# Patient Record
Sex: MALE | Race: ASIAN | NOT HISPANIC OR LATINO
[De-identification: names, ages, dates, MRNs, and addresses within clinical notes are randomized per-mention and may not be internally consistent; named-entity substitution may affect disease eponyms.]

---

## 2017-01-12 ENCOUNTER — RECORDS - HEALTHEAST (OUTPATIENT)
Dept: ADMINISTRATIVE | Facility: OTHER | Age: 77
End: 2017-01-12

## 2017-01-23 ENCOUNTER — OFFICE VISIT - HEALTHEAST (OUTPATIENT)
Dept: FAMILY MEDICINE | Facility: CLINIC | Age: 77
End: 2017-01-23

## 2017-01-23 DIAGNOSIS — M10.9 ACUTE GOUT: ICD-10-CM

## 2017-01-23 DIAGNOSIS — M10.9 GOUT: ICD-10-CM

## 2017-01-23 ASSESSMENT — MIFFLIN-ST. JEOR: SCORE: 1288.66

## 2017-01-26 ENCOUNTER — COMMUNICATION - HEALTHEAST (OUTPATIENT)
Dept: FAMILY MEDICINE | Facility: CLINIC | Age: 77
End: 2017-01-26

## 2017-01-26 DIAGNOSIS — M10.9 ACUTE GOUT: ICD-10-CM

## 2017-02-01 ENCOUNTER — COMMUNICATION - HEALTHEAST (OUTPATIENT)
Dept: FAMILY MEDICINE | Facility: CLINIC | Age: 77
End: 2017-02-01

## 2017-02-01 DIAGNOSIS — E55.9 UNSPECIFIED VITAMIN D DEFICIENCY: ICD-10-CM

## 2017-03-23 ENCOUNTER — RECORDS - HEALTHEAST (OUTPATIENT)
Dept: ADMINISTRATIVE | Facility: OTHER | Age: 77
End: 2017-03-23

## 2017-04-12 ENCOUNTER — COMMUNICATION - HEALTHEAST (OUTPATIENT)
Dept: FAMILY MEDICINE | Facility: CLINIC | Age: 77
End: 2017-04-12

## 2017-04-17 ENCOUNTER — OFFICE VISIT - HEALTHEAST (OUTPATIENT)
Dept: FAMILY MEDICINE | Facility: CLINIC | Age: 77
End: 2017-04-17

## 2017-04-17 DIAGNOSIS — R50.9 FEVER: ICD-10-CM

## 2017-04-17 ASSESSMENT — MIFFLIN-ST. JEOR: SCORE: 1296.6

## 2017-04-18 ENCOUNTER — RECORDS - HEALTHEAST (OUTPATIENT)
Dept: ADMINISTRATIVE | Facility: OTHER | Age: 77
End: 2017-04-18

## 2017-04-20 ENCOUNTER — COMMUNICATION - HEALTHEAST (OUTPATIENT)
Dept: FAMILY MEDICINE | Facility: CLINIC | Age: 77
End: 2017-04-20

## 2017-05-05 ENCOUNTER — COMMUNICATION - HEALTHEAST (OUTPATIENT)
Dept: SCHEDULING | Facility: CLINIC | Age: 77
End: 2017-05-05

## 2017-05-05 DIAGNOSIS — M10.9 GOUT: ICD-10-CM

## 2017-05-31 ENCOUNTER — OFFICE VISIT - HEALTHEAST (OUTPATIENT)
Dept: FAMILY MEDICINE | Facility: CLINIC | Age: 77
End: 2017-05-31

## 2017-05-31 DIAGNOSIS — R04.2 COUGHING UP BLOOD: ICD-10-CM

## 2017-06-27 ENCOUNTER — COMMUNICATION - HEALTHEAST (OUTPATIENT)
Dept: FAMILY MEDICINE | Facility: CLINIC | Age: 77
End: 2017-06-27

## 2017-06-29 ENCOUNTER — OFFICE VISIT - HEALTHEAST (OUTPATIENT)
Dept: FAMILY MEDICINE | Facility: CLINIC | Age: 77
End: 2017-06-29

## 2017-06-29 DIAGNOSIS — R04.2 HEMOPTYSIS: ICD-10-CM

## 2017-06-29 DIAGNOSIS — I10 HTN (HYPERTENSION): ICD-10-CM

## 2017-06-29 DIAGNOSIS — Z79.899 POLYPHARMACY: ICD-10-CM

## 2017-06-29 DIAGNOSIS — M10.9 GOUT, UNSPECIFIED: ICD-10-CM

## 2017-06-29 DIAGNOSIS — E55.9 VITAMIN D DEFICIENCY: ICD-10-CM

## 2017-06-29 ASSESSMENT — MIFFLIN-ST. JEOR: SCORE: 1297.96

## 2017-07-25 ENCOUNTER — COMMUNICATION - HEALTHEAST (OUTPATIENT)
Dept: FAMILY MEDICINE | Facility: CLINIC | Age: 77
End: 2017-07-25

## 2017-07-25 DIAGNOSIS — I10 ESSENTIAL HYPERTENSION: ICD-10-CM

## 2017-07-25 DIAGNOSIS — M10.9 ACUTE GOUT: ICD-10-CM

## 2017-07-25 DIAGNOSIS — M25.562 ARTHRALGIA OF LEFT LOWER LEG: ICD-10-CM

## 2017-08-14 ENCOUNTER — RECORDS - HEALTHEAST (OUTPATIENT)
Dept: ADMINISTRATIVE | Facility: OTHER | Age: 77
End: 2017-08-14

## 2017-08-31 ENCOUNTER — COMMUNICATION - HEALTHEAST (OUTPATIENT)
Dept: FAMILY MEDICINE | Facility: CLINIC | Age: 77
End: 2017-08-31

## 2017-08-31 ENCOUNTER — OFFICE VISIT - HEALTHEAST (OUTPATIENT)
Dept: FAMILY MEDICINE | Facility: CLINIC | Age: 77
End: 2017-08-31

## 2017-08-31 DIAGNOSIS — E55.9 VITAMIN D DEFICIENCY: ICD-10-CM

## 2017-08-31 DIAGNOSIS — Z23 NEED FOR IMMUNIZATION AGAINST INFLUENZA: ICD-10-CM

## 2017-08-31 DIAGNOSIS — I10 ESSENTIAL HYPERTENSION: ICD-10-CM

## 2017-08-31 DIAGNOSIS — M10.072 ACUTE IDIOPATHIC GOUT OF LEFT ANKLE: ICD-10-CM

## 2017-08-31 ASSESSMENT — MIFFLIN-ST. JEOR: SCORE: 1314.74

## 2017-09-04 ENCOUNTER — COMMUNICATION - HEALTHEAST (OUTPATIENT)
Dept: FAMILY MEDICINE | Facility: CLINIC | Age: 77
End: 2017-09-04

## 2017-09-04 DIAGNOSIS — E55.9 UNSPECIFIED VITAMIN D DEFICIENCY: ICD-10-CM

## 2017-10-02 ENCOUNTER — OFFICE VISIT - HEALTHEAST (OUTPATIENT)
Dept: FAMILY MEDICINE | Facility: CLINIC | Age: 77
End: 2017-10-02

## 2017-10-02 ENCOUNTER — RECORDS - HEALTHEAST (OUTPATIENT)
Dept: GENERAL RADIOLOGY | Facility: CLINIC | Age: 77
End: 2017-10-02

## 2017-10-02 ENCOUNTER — COMMUNICATION - HEALTHEAST (OUTPATIENT)
Dept: FAMILY MEDICINE | Facility: CLINIC | Age: 77
End: 2017-10-02

## 2017-10-02 DIAGNOSIS — M79.672 FOOT PAIN, LEFT: ICD-10-CM

## 2017-10-02 DIAGNOSIS — J30.2 SEASONAL ALLERGIES: ICD-10-CM

## 2017-10-02 DIAGNOSIS — M10.9 GOUT FLARE: ICD-10-CM

## 2017-10-02 DIAGNOSIS — M10.9 GOUT: ICD-10-CM

## 2017-10-02 DIAGNOSIS — I10 HTN (HYPERTENSION): ICD-10-CM

## 2017-10-02 DIAGNOSIS — M79.672 PAIN IN LEFT FOOT: ICD-10-CM

## 2017-10-02 ASSESSMENT — MIFFLIN-ST. JEOR: SCORE: 1305.33

## 2017-11-15 ENCOUNTER — COMMUNICATION - HEALTHEAST (OUTPATIENT)
Dept: FAMILY MEDICINE | Facility: CLINIC | Age: 77
End: 2017-11-15

## 2017-11-15 DIAGNOSIS — E55.9 VITAMIN D DEFICIENCY: ICD-10-CM

## 2017-11-27 ENCOUNTER — COMMUNICATION - HEALTHEAST (OUTPATIENT)
Dept: SCHEDULING | Facility: CLINIC | Age: 77
End: 2017-11-27

## 2017-11-27 DIAGNOSIS — M10.9 GOUT: ICD-10-CM

## 2018-01-01 ENCOUNTER — COMMUNICATION - HEALTHEAST (OUTPATIENT)
Dept: FAMILY MEDICINE | Facility: CLINIC | Age: 78
End: 2018-01-01

## 2018-01-01 DIAGNOSIS — E55.9 VITAMIN D DEFICIENCY: ICD-10-CM

## 2018-01-01 RX ORDER — MULTIVITAMIN WITH FOLIC ACID 400 MCG
TABLET ORAL
Qty: 100 TABLET | Refills: 3 | Status: SHIPPED | OUTPATIENT
Start: 2018-01-01

## 2018-01-04 ENCOUNTER — OFFICE VISIT - HEALTHEAST (OUTPATIENT)
Dept: FAMILY MEDICINE | Facility: CLINIC | Age: 78
End: 2018-01-04

## 2018-01-04 DIAGNOSIS — Z79.899 POLYPHARMACY: ICD-10-CM

## 2018-01-04 DIAGNOSIS — Z00.00 PREVENTATIVE HEALTH CARE: ICD-10-CM

## 2018-01-04 DIAGNOSIS — E55.9 VITAMIN D DEFICIENCY: ICD-10-CM

## 2018-01-04 DIAGNOSIS — I10 HTN (HYPERTENSION): ICD-10-CM

## 2018-01-04 DIAGNOSIS — Z51.81 THERAPEUTIC DRUG MONITORING: ICD-10-CM

## 2018-01-04 DIAGNOSIS — M10.9 GOUT: ICD-10-CM

## 2018-01-04 LAB
ALBUMIN SERPL-MCNC: 3.6 G/DL (ref 3.5–5)
ALP SERPL-CCNC: 60 U/L (ref 45–120)
ALT SERPL W P-5'-P-CCNC: 31 U/L (ref 0–45)
ANION GAP SERPL CALCULATED.3IONS-SCNC: 10 MMOL/L (ref 5–18)
AST SERPL W P-5'-P-CCNC: 36 U/L (ref 0–40)
BILIRUB SERPL-MCNC: 0.5 MG/DL (ref 0–1)
BUN SERPL-MCNC: 23 MG/DL (ref 8–28)
CALCIUM SERPL-MCNC: 9.8 MG/DL (ref 8.5–10.5)
CHLORIDE BLD-SCNC: 106 MMOL/L (ref 98–107)
CHOLEST SERPL-MCNC: 165 MG/DL
CO2 SERPL-SCNC: 23 MMOL/L (ref 22–31)
CREAT SERPL-MCNC: 1.02 MG/DL (ref 0.7–1.3)
FASTING STATUS PATIENT QL REPORTED: NO
GFR SERPL CREATININE-BSD FRML MDRD: >60 ML/MIN/1.73M2
GLUCOSE BLD-MCNC: 85 MG/DL (ref 70–125)
HBA1C MFR BLD: 5.5 % (ref 3.5–6)
HDLC SERPL-MCNC: 38 MG/DL
LDLC SERPL CALC-MCNC: 71 MG/DL
LDLC SERPL CALC-MCNC: ABNORMAL MG/DL
POTASSIUM BLD-SCNC: 4.6 MMOL/L (ref 3.5–5)
PROT SERPL-MCNC: 8.1 G/DL (ref 6–8)
SODIUM SERPL-SCNC: 139 MMOL/L (ref 136–145)
TRIGL SERPL-MCNC: 461 MG/DL
URATE SERPL-MCNC: 5.7 MG/DL (ref 3–8)

## 2018-01-04 ASSESSMENT — MIFFLIN-ST. JEOR: SCORE: 1303.97

## 2018-01-05 LAB — 25(OH)D3 SERPL-MCNC: 50.4 NG/ML (ref 30–80)

## 2018-02-13 ENCOUNTER — RECORDS - HEALTHEAST (OUTPATIENT)
Dept: ADMINISTRATIVE | Facility: OTHER | Age: 78
End: 2018-02-13

## 2018-02-15 ENCOUNTER — OFFICE VISIT - HEALTHEAST (OUTPATIENT)
Dept: FAMILY MEDICINE | Facility: CLINIC | Age: 78
End: 2018-02-15

## 2018-02-15 DIAGNOSIS — M10.9 ACUTE GOUT OF RIGHT WRIST, UNSPECIFIED CAUSE: ICD-10-CM

## 2018-02-15 ASSESSMENT — MIFFLIN-ST. JEOR: SCORE: 1308.51

## 2018-04-03 ENCOUNTER — OFFICE VISIT - HEALTHEAST (OUTPATIENT)
Dept: FAMILY MEDICINE | Facility: CLINIC | Age: 78
End: 2018-04-03

## 2018-04-03 ENCOUNTER — RECORDS - HEALTHEAST (OUTPATIENT)
Dept: GENERAL RADIOLOGY | Facility: CLINIC | Age: 78
End: 2018-04-03

## 2018-04-03 DIAGNOSIS — M25.531 RIGHT WRIST PAIN: ICD-10-CM

## 2018-04-03 DIAGNOSIS — E55.9 VITAMIN D DEFICIENCY: ICD-10-CM

## 2018-04-03 DIAGNOSIS — Z79.899 POLYPHARMACY: ICD-10-CM

## 2018-04-03 DIAGNOSIS — M10.9 GOUT: ICD-10-CM

## 2018-04-03 DIAGNOSIS — M25.531 PAIN IN RIGHT WRIST: ICD-10-CM

## 2018-04-03 DIAGNOSIS — I10 HTN (HYPERTENSION): ICD-10-CM

## 2018-04-03 ASSESSMENT — MIFFLIN-ST. JEOR: SCORE: 1312.02

## 2018-05-03 ENCOUNTER — OFFICE VISIT - HEALTHEAST (OUTPATIENT)
Dept: FAMILY MEDICINE | Facility: CLINIC | Age: 78
End: 2018-05-03

## 2018-05-03 DIAGNOSIS — M10.9 GOUT: ICD-10-CM

## 2018-05-03 DIAGNOSIS — Z79.899 POLYPHARMACY: ICD-10-CM

## 2018-05-03 DIAGNOSIS — E55.9 VITAMIN D DEFICIENCY: ICD-10-CM

## 2018-05-03 DIAGNOSIS — I10 HTN (HYPERTENSION): ICD-10-CM

## 2018-05-03 ASSESSMENT — MIFFLIN-ST. JEOR: SCORE: 1321.83

## 2018-05-22 ENCOUNTER — COMMUNICATION - HEALTHEAST (OUTPATIENT)
Dept: FAMILY MEDICINE | Facility: CLINIC | Age: 78
End: 2018-05-22

## 2018-05-22 DIAGNOSIS — M25.562 ARTHRALGIA OF LEFT LOWER LEG: ICD-10-CM

## 2018-05-31 ENCOUNTER — RECORDS - HEALTHEAST (OUTPATIENT)
Dept: ADMINISTRATIVE | Facility: OTHER | Age: 78
End: 2018-05-31

## 2018-07-17 ENCOUNTER — RECORDS - HEALTHEAST (OUTPATIENT)
Dept: ADMINISTRATIVE | Facility: OTHER | Age: 78
End: 2018-07-17

## 2018-08-09 ENCOUNTER — OFFICE VISIT - HEALTHEAST (OUTPATIENT)
Dept: FAMILY MEDICINE | Facility: CLINIC | Age: 78
End: 2018-08-09

## 2018-08-09 DIAGNOSIS — M10.9 GOUT: ICD-10-CM

## 2018-08-09 DIAGNOSIS — I10 HTN (HYPERTENSION): ICD-10-CM

## 2018-08-09 DIAGNOSIS — E55.9 VITAMIN D DEFICIENCY: ICD-10-CM

## 2018-08-09 DIAGNOSIS — Z79.899 POLYPHARMACY: ICD-10-CM

## 2018-08-09 LAB — URATE SERPL-MCNC: 10.1 MG/DL (ref 3–8)

## 2018-08-09 ASSESSMENT — MIFFLIN-ST. JEOR: SCORE: 1286.05

## 2018-08-10 LAB — 25(OH)D3 SERPL-MCNC: 47.8 NG/ML (ref 30–80)

## 2018-08-14 ENCOUNTER — COMMUNICATION - HEALTHEAST (OUTPATIENT)
Dept: FAMILY MEDICINE | Facility: CLINIC | Age: 78
End: 2018-08-14

## 2018-08-14 DIAGNOSIS — J30.2 SEASONAL ALLERGIES: ICD-10-CM

## 2018-08-28 ENCOUNTER — COMMUNICATION - HEALTHEAST (OUTPATIENT)
Dept: FAMILY MEDICINE | Facility: CLINIC | Age: 78
End: 2018-08-28

## 2018-08-28 DIAGNOSIS — E55.9 VITAMIN D DEFICIENCY: ICD-10-CM

## 2018-08-31 ENCOUNTER — COMMUNICATION - HEALTHEAST (OUTPATIENT)
Dept: FAMILY MEDICINE | Facility: CLINIC | Age: 78
End: 2018-08-31

## 2018-08-31 DIAGNOSIS — E55.9 VITAMIN D DEFICIENCY: ICD-10-CM

## 2019-01-01 ENCOUNTER — AMBULATORY - HEALTHEAST (OUTPATIENT)
Dept: INFUSION THERAPY | Facility: HOSPITAL | Age: 79
End: 2019-01-01

## 2019-01-01 ENCOUNTER — COMMUNICATION - HEALTHEAST (OUTPATIENT)
Dept: ONCOLOGY | Facility: HOSPITAL | Age: 79
End: 2019-01-01

## 2019-01-01 ENCOUNTER — OFFICE VISIT - HEALTHEAST (OUTPATIENT)
Dept: ONCOLOGY | Facility: HOSPITAL | Age: 79
End: 2019-01-01

## 2019-01-01 ENCOUNTER — INFUSION - HEALTHEAST (OUTPATIENT)
Dept: INFUSION THERAPY | Facility: HOSPITAL | Age: 79
End: 2019-01-01

## 2019-01-01 ENCOUNTER — RECORDS - HEALTHEAST (OUTPATIENT)
Dept: ADMINISTRATIVE | Facility: OTHER | Age: 79
End: 2019-01-01

## 2019-01-01 ENCOUNTER — AMBULATORY - HEALTHEAST (OUTPATIENT)
Dept: ONCOLOGY | Facility: HOSPITAL | Age: 79
End: 2019-01-01

## 2019-01-01 ENCOUNTER — HOSPITAL ENCOUNTER (OUTPATIENT)
Dept: PET IMAGING | Facility: HOSPITAL | Age: 79
Setting detail: RADIATION/ONCOLOGY SERIES
Discharge: STILL ADMITTED OR ADMINISTRATIVE | End: 2019-07-26
Attending: INTERNAL MEDICINE

## 2019-01-01 ENCOUNTER — COMMUNICATION - HEALTHEAST (OUTPATIENT)
Dept: FAMILY MEDICINE | Facility: CLINIC | Age: 79
End: 2019-01-01

## 2019-01-01 ENCOUNTER — HOSPITAL ENCOUNTER (OUTPATIENT)
Dept: INTERVENTIONAL RADIOLOGY/VASCULAR | Facility: HOSPITAL | Age: 79
Setting detail: RADIATION/ONCOLOGY SERIES
Discharge: STILL ADMITTED OR ADMINISTRATIVE | End: 2019-07-22
Attending: INTERNAL MEDICINE

## 2019-01-01 ENCOUNTER — COMMUNICATION - HEALTHEAST (OUTPATIENT)
Dept: GERIATRIC MEDICINE | Facility: CLINIC | Age: 79
End: 2019-01-01

## 2019-01-01 ENCOUNTER — HOSPITAL ENCOUNTER (OUTPATIENT)
Dept: PET IMAGING | Facility: HOSPITAL | Age: 79
Setting detail: RADIATION/ONCOLOGY SERIES
Discharge: STILL ADMITTED OR ADMINISTRATIVE | End: 2019-09-25
Attending: INTERNAL MEDICINE

## 2019-01-01 ENCOUNTER — COMMUNICATION - HEALTHEAST (OUTPATIENT)
Dept: SCHEDULING | Facility: CLINIC | Age: 79
End: 2019-01-01

## 2019-01-01 ENCOUNTER — OFFICE VISIT - HEALTHEAST (OUTPATIENT)
Dept: FAMILY MEDICINE | Facility: CLINIC | Age: 79
End: 2019-01-01

## 2019-01-01 ENCOUNTER — HOSPITAL ENCOUNTER (OUTPATIENT)
Dept: ULTRASOUND IMAGING | Facility: HOSPITAL | Age: 79
Discharge: HOME | End: 2019-06-13
Attending: FAMILY MEDICINE

## 2019-01-01 ENCOUNTER — HOSPITAL ENCOUNTER (OUTPATIENT)
Dept: CT IMAGING | Facility: HOSPITAL | Age: 79
Discharge: HOME | End: 2019-06-13
Attending: FAMILY MEDICINE

## 2019-01-01 ENCOUNTER — HOSPITAL ENCOUNTER (OUTPATIENT)
Dept: PET IMAGING | Facility: HOSPITAL | Age: 79
Setting detail: RADIATION/ONCOLOGY SERIES
Discharge: STILL ADMITTED OR ADMINISTRATIVE | End: 2019-07-24
Attending: INTERNAL MEDICINE

## 2019-01-01 ENCOUNTER — COMMUNICATION - HEALTHEAST (OUTPATIENT)
Dept: CARE COORDINATION | Facility: CLINIC | Age: 79
End: 2019-01-01

## 2019-01-01 ENCOUNTER — HOSPITAL ENCOUNTER (OUTPATIENT)
Dept: NUCLEAR MEDICINE | Facility: HOSPITAL | Age: 79
Setting detail: RADIATION/ONCOLOGY SERIES
Discharge: STILL ADMITTED OR ADMINISTRATIVE | End: 2019-07-26
Attending: INTERNAL MEDICINE

## 2019-01-01 ENCOUNTER — HOSPITAL ENCOUNTER (OUTPATIENT)
Dept: ULTRASOUND IMAGING | Facility: HOSPITAL | Age: 79
Discharge: HOME | End: 2019-07-12
Attending: INTERNAL MEDICINE | Admitting: RADIOLOGY

## 2019-01-01 DIAGNOSIS — H91.8X3 OTHER SPECIFIED HEARING LOSS OF BOTH EARS: ICD-10-CM

## 2019-01-01 DIAGNOSIS — M62.81 MUSCLE WEAKNESS (GENERALIZED): ICD-10-CM

## 2019-01-01 DIAGNOSIS — C83.38 DIFFUSE LARGE B-CELL LYMPHOMA OF LYMPH NODES OF MULTIPLE REGIONS (H): ICD-10-CM

## 2019-01-01 DIAGNOSIS — Z60.3 LANGUAGE BARRIER AFFECTING HEALTH CARE: ICD-10-CM

## 2019-01-01 DIAGNOSIS — Z59.6 POVERTY: ICD-10-CM

## 2019-01-01 DIAGNOSIS — I10 ESSENTIAL HYPERTENSION: ICD-10-CM

## 2019-01-01 DIAGNOSIS — J47.9 BRONCHIECTASIS WITHOUT ACUTE EXACERBATION (H): ICD-10-CM

## 2019-01-01 DIAGNOSIS — R19.09 INGUINAL SWELLING: ICD-10-CM

## 2019-01-01 DIAGNOSIS — N50.89 TESTICULAR MASS: ICD-10-CM

## 2019-01-01 DIAGNOSIS — E55.9 VITAMIN D DEFICIENCY: ICD-10-CM

## 2019-01-01 DIAGNOSIS — R26.2 DIFFICULTY WALKING: ICD-10-CM

## 2019-01-01 DIAGNOSIS — D63.8 ANEMIA, CHRONIC DISEASE: ICD-10-CM

## 2019-01-01 DIAGNOSIS — B35.1 DERMATOPHYTOSIS OF NAIL: ICD-10-CM

## 2019-01-01 DIAGNOSIS — Z75.8 LANGUAGE BARRIER AFFECTING HEALTH CARE: ICD-10-CM

## 2019-01-01 DIAGNOSIS — C85.89 DIFFUSE NON-HODGKIN'S LYMPHOMA OF TESTIS (H): ICD-10-CM

## 2019-01-01 DIAGNOSIS — D49.59 TESTICULAR TUMOR: ICD-10-CM

## 2019-01-01 DIAGNOSIS — M25.562 ARTHRALGIA OF BOTH LOWER LEGS: ICD-10-CM

## 2019-01-01 DIAGNOSIS — N50.89 SCROTAL MASS: ICD-10-CM

## 2019-01-01 DIAGNOSIS — M25.561 ARTHRALGIA OF BOTH LOWER LEGS: ICD-10-CM

## 2019-01-01 DIAGNOSIS — Z23 NEED FOR IMMUNIZATION AGAINST INFLUENZA: ICD-10-CM

## 2019-01-01 DIAGNOSIS — M1A.0710 IDIOPATHIC CHRONIC GOUT OF RIGHT ANKLE WITHOUT TOPHUS: ICD-10-CM

## 2019-01-01 DIAGNOSIS — R76.11 NONSPECIFIC REACTION TO TUBERCULIN SKIN TEST WITHOUT ACTIVE TUBERCULOSIS: ICD-10-CM

## 2019-01-01 DIAGNOSIS — Z51.11 ENCOUNTER FOR ANTINEOPLASTIC CHEMOTHERAPY: ICD-10-CM

## 2019-01-01 DIAGNOSIS — Z79.899 POLYPHARMACY: ICD-10-CM

## 2019-01-01 DIAGNOSIS — M25.562 ARTHRALGIA OF LEFT LOWER LEG: ICD-10-CM

## 2019-01-01 DIAGNOSIS — Z51.81 ENCOUNTER FOR THERAPEUTIC DRUG MONITORING: ICD-10-CM

## 2019-01-01 DIAGNOSIS — Z51.81 THERAPEUTIC DRUG MONITORING: ICD-10-CM

## 2019-01-01 DIAGNOSIS — Z11.1 SCREENING EXAMINATION FOR PULMONARY TUBERCULOSIS: ICD-10-CM

## 2019-01-01 DIAGNOSIS — J30.2 SEASONAL ALLERGIES: ICD-10-CM

## 2019-01-01 DIAGNOSIS — R59.0 INGUINAL LYMPHADENOPATHY: ICD-10-CM

## 2019-01-01 DIAGNOSIS — M10.9 GOUT: ICD-10-CM

## 2019-01-01 DIAGNOSIS — M10.072 ACUTE IDIOPATHIC GOUT OF LEFT ANKLE: ICD-10-CM

## 2019-01-01 DIAGNOSIS — E78.2 MIXED HYPERLIPIDEMIA: ICD-10-CM

## 2019-01-01 DIAGNOSIS — I10 HTN (HYPERTENSION): ICD-10-CM

## 2019-01-01 DIAGNOSIS — Z09 HOSPITAL DISCHARGE FOLLOW-UP: ICD-10-CM

## 2019-01-01 DIAGNOSIS — N50.9 DISORDER OF MALE GENITAL ORGANS, UNSPECIFIED: ICD-10-CM

## 2019-01-01 DIAGNOSIS — Z86.2 HISTORY OF ANEMIA: ICD-10-CM

## 2019-01-01 DIAGNOSIS — L85.3 DRY SKIN: ICD-10-CM

## 2019-01-01 DIAGNOSIS — M10.00 IDIOPATHIC GOUT, UNSPECIFIED CHRONICITY, UNSPECIFIED SITE: ICD-10-CM

## 2019-01-01 DIAGNOSIS — Z22.7 LTBI (LATENT TUBERCULOSIS INFECTION): ICD-10-CM

## 2019-01-01 LAB
25(OH)D3 SERPL-MCNC: 38.6 NG/ML (ref 30–80)
ABO/RH(D): NORMAL
AFP SERPL-MCNC: 3.1 UG/ML
ALBUMIN SERPL-MCNC: 2.3 G/DL (ref 3.5–5)
ALBUMIN SERPL-MCNC: 2.8 G/DL (ref 3.5–5)
ALBUMIN SERPL-MCNC: 2.9 G/DL (ref 3.5–5)
ALBUMIN SERPL-MCNC: 3.1 G/DL (ref 3.5–5)
ALBUMIN SERPL-MCNC: 3.6 G/DL (ref 3.5–5)
ALP SERPL-CCNC: 113 U/L (ref 45–120)
ALP SERPL-CCNC: 117 U/L (ref 45–120)
ALP SERPL-CCNC: 52 U/L (ref 45–120)
ALP SERPL-CCNC: 74 U/L (ref 45–120)
ALP SERPL-CCNC: 80 U/L (ref 45–120)
ALT SERPL W P-5'-P-CCNC: 15 U/L (ref 0–45)
ALT SERPL W P-5'-P-CCNC: 22 U/L (ref 0–45)
ALT SERPL W P-5'-P-CCNC: 26 U/L (ref 0–45)
ALT SERPL W P-5'-P-CCNC: 28 U/L (ref 0–45)
ALT SERPL W P-5'-P-CCNC: 40 U/L (ref 0–45)
ANION GAP SERPL CALCULATED.3IONS-SCNC: 10 MMOL/L (ref 5–18)
ANION GAP SERPL CALCULATED.3IONS-SCNC: 11 MMOL/L (ref 5–18)
ANION GAP SERPL CALCULATED.3IONS-SCNC: 6 MMOL/L (ref 5–18)
ANION GAP SERPL CALCULATED.3IONS-SCNC: 6 MMOL/L (ref 5–18)
ANION GAP SERPL CALCULATED.3IONS-SCNC: 7 MMOL/L (ref 5–18)
ANION GAP SERPL CALCULATED.3IONS-SCNC: 8 MMOL/L (ref 5–18)
ANION GAP SERPL CALCULATED.3IONS-SCNC: 9 MMOL/L (ref 5–18)
ANION GAP SERPL CALCULATED.3IONS-SCNC: 9 MMOL/L (ref 5–18)
ANTIBODY SCREEN: NEGATIVE
ARUP MISCELLANEOUS TEST: ABNORMAL
AST SERPL W P-5'-P-CCNC: 12 U/L (ref 0–40)
AST SERPL W P-5'-P-CCNC: 18 U/L (ref 0–40)
AST SERPL W P-5'-P-CCNC: 25 U/L (ref 0–40)
AST SERPL W P-5'-P-CCNC: 28 U/L (ref 0–40)
AST SERPL W P-5'-P-CCNC: 31 U/L (ref 0–40)
BASOPHILS # BLD AUTO: 0 THOU/UL (ref 0–0.2)
BASOPHILS # BLD AUTO: 0.1 THOU/UL (ref 0–0.2)
BASOPHILS # BLD AUTO: 0.1 THOU/UL (ref 0–0.2)
BASOPHILS # BLD AUTO: 0.2 THOU/UL (ref 0–0.2)
BASOPHILS NFR BLD AUTO: 0 % (ref 0–2)
BASOPHILS NFR BLD AUTO: 1 % (ref 0–2)
BASOPHILS NFR BLD AUTO: 2 % (ref 0–2)
BILIRUB SERPL-MCNC: 0.2 MG/DL (ref 0–1)
BILIRUB SERPL-MCNC: 0.2 MG/DL (ref 0–1)
BILIRUB SERPL-MCNC: 0.3 MG/DL (ref 0–1)
BILIRUB SERPL-MCNC: 0.4 MG/DL (ref 0–1)
BILIRUB SERPL-MCNC: 0.5 MG/DL (ref 0–1)
BLD PROD TYP BPU: NORMAL
BLOOD EXPIRATION DATE: NORMAL
BLOOD TYPE: 6200
BUN SERPL-MCNC: 18 MG/DL (ref 8–28)
BUN SERPL-MCNC: 19 MG/DL (ref 8–28)
BUN SERPL-MCNC: 20 MG/DL (ref 8–28)
BUN SERPL-MCNC: 21 MG/DL (ref 8–28)
BUN SERPL-MCNC: 21 MG/DL (ref 8–28)
BUN SERPL-MCNC: 23 MG/DL (ref 8–28)
BUN SERPL-MCNC: 24 MG/DL (ref 8–28)
BUN SERPL-MCNC: 25 MG/DL (ref 8–28)
BUN SERPL-MCNC: 25 MG/DL (ref 8–28)
BUN SERPL-MCNC: 27 MG/DL (ref 8–28)
BUN SERPL-MCNC: 30 MG/DL (ref 8–28)
BUN SERPL-MCNC: 32 MG/DL (ref 8–28)
CALCIUM SERPL-MCNC: 10.4 MG/DL (ref 8.5–10.5)
CALCIUM SERPL-MCNC: 8.4 MG/DL (ref 8.5–10.5)
CALCIUM SERPL-MCNC: 8.5 MG/DL (ref 8.5–10.5)
CALCIUM SERPL-MCNC: 8.7 MG/DL (ref 8.5–10.5)
CALCIUM SERPL-MCNC: 8.8 MG/DL (ref 8.5–10.5)
CALCIUM SERPL-MCNC: 8.8 MG/DL (ref 8.5–10.5)
CALCIUM SERPL-MCNC: 9 MG/DL (ref 8.5–10.5)
CALCIUM SERPL-MCNC: 9.2 MG/DL (ref 8.5–10.5)
CALCIUM SERPL-MCNC: 9.6 MG/DL (ref 8.5–10.5)
CALCIUM SERPL-MCNC: 9.6 MG/DL (ref 8.5–10.5)
CALCIUM SERPL-MCNC: 9.8 MG/DL (ref 8.5–10.5)
CAP COMMENT: ABNORMAL
CHLORIDE BLD-SCNC: 100 MMOL/L (ref 98–107)
CHLORIDE BLD-SCNC: 100 MMOL/L (ref 98–107)
CHLORIDE BLD-SCNC: 102 MMOL/L (ref 98–107)
CHLORIDE BLD-SCNC: 103 MMOL/L (ref 98–107)
CHLORIDE BLD-SCNC: 103 MMOL/L (ref 98–107)
CHLORIDE BLD-SCNC: 104 MMOL/L (ref 98–107)
CHLORIDE BLD-SCNC: 105 MMOL/L (ref 98–107)
CHLORIDE BLD-SCNC: 106 MMOL/L (ref 98–107)
CHLORIDE BLD-SCNC: 106 MMOL/L (ref 98–107)
CHLORIDE BLD-SCNC: 99 MMOL/L (ref 98–107)
CHOLEST SERPL-MCNC: 162 MG/DL
CO2 SERPL-SCNC: 25 MMOL/L (ref 22–31)
CO2 SERPL-SCNC: 26 MMOL/L (ref 22–31)
CO2 SERPL-SCNC: 27 MMOL/L (ref 22–31)
CO2 SERPL-SCNC: 29 MMOL/L (ref 22–31)
CO2 SERPL-SCNC: 30 MMOL/L (ref 22–31)
CO2 SERPL-SCNC: 33 MMOL/L (ref 22–31)
CODING SYSTEM: NORMAL
COMPONENT (HISTORICAL CONVERSION): NORMAL
CREAT BLD-MCNC: 1 MG/DL (ref 0.7–1.3)
CREAT SERPL-MCNC: 0.71 MG/DL (ref 0.7–1.3)
CREAT SERPL-MCNC: 0.72 MG/DL (ref 0.7–1.3)
CREAT SERPL-MCNC: 0.76 MG/DL (ref 0.7–1.3)
CREAT SERPL-MCNC: 0.76 MG/DL (ref 0.7–1.3)
CREAT SERPL-MCNC: 0.77 MG/DL (ref 0.7–1.3)
CREAT SERPL-MCNC: 0.77 MG/DL (ref 0.7–1.3)
CREAT SERPL-MCNC: 0.79 MG/DL (ref 0.7–1.3)
CREAT SERPL-MCNC: 0.81 MG/DL (ref 0.7–1.3)
CREAT SERPL-MCNC: 0.83 MG/DL (ref 0.7–1.3)
CREAT SERPL-MCNC: 0.9 MG/DL (ref 0.7–1.3)
CREAT SERPL-MCNC: 0.92 MG/DL (ref 0.7–1.3)
CREAT SERPL-MCNC: 0.92 MG/DL (ref 0.7–1.3)
CREAT SERPL-MCNC: 0.95 MG/DL (ref 0.7–1.3)
CREAT SERPL-MCNC: 1.04 MG/DL (ref 0.7–1.3)
CREAT SERPL-MCNC: 1.36 MG/DL (ref 0.7–1.3)
CROSSMATCH: NORMAL
DOHLE BODIES: ABNORMAL
EOSINOPHIL # BLD AUTO: 0 THOU/UL (ref 0–0.4)
EOSINOPHIL # BLD AUTO: 0.1 THOU/UL (ref 0–0.4)
EOSINOPHIL # BLD AUTO: 0.2 THOU/UL (ref 0–0.4)
EOSINOPHIL # BLD AUTO: 0.5 THOU/UL (ref 0–0.4)
EOSINOPHIL # BLD AUTO: 0.5 THOU/UL (ref 0–0.4)
EOSINOPHIL COUNT (ABSOLUTE): 0 THOU/UL (ref 0–0.4)
EOSINOPHIL COUNT (ABSOLUTE): 0.1 THOU/UL (ref 0–0.4)
EOSINOPHIL COUNT (ABSOLUTE): 0.3 THOU/UL (ref 0–0.4)
EOSINOPHIL NFR BLD AUTO: 0 % (ref 0–6)
EOSINOPHIL NFR BLD AUTO: 1 % (ref 0–6)
EOSINOPHIL NFR BLD AUTO: 1 % (ref 0–6)
EOSINOPHIL NFR BLD AUTO: 2 % (ref 0–6)
EOSINOPHIL NFR BLD AUTO: 2 % (ref 0–6)
EOSINOPHIL NFR BLD AUTO: 7 % (ref 0–6)
EOSINOPHIL NFR BLD AUTO: 9 % (ref 0–6)
ERYTHROCYTE [DISTWIDTH] IN BLOOD BY AUTOMATED COUNT: 11.4 % (ref 11–14.5)
ERYTHROCYTE [DISTWIDTH] IN BLOOD BY AUTOMATED COUNT: 13.6 % (ref 11–14.5)
ERYTHROCYTE [DISTWIDTH] IN BLOOD BY AUTOMATED COUNT: 13.8 % (ref 11–14.5)
ERYTHROCYTE [DISTWIDTH] IN BLOOD BY AUTOMATED COUNT: 14 % (ref 11–14.5)
ERYTHROCYTE [DISTWIDTH] IN BLOOD BY AUTOMATED COUNT: 14.1 % (ref 11–14.5)
ERYTHROCYTE [DISTWIDTH] IN BLOOD BY AUTOMATED COUNT: 14.2 % (ref 11–14.5)
ERYTHROCYTE [DISTWIDTH] IN BLOOD BY AUTOMATED COUNT: 14.6 % (ref 11–14.5)
ERYTHROCYTE [DISTWIDTH] IN BLOOD BY AUTOMATED COUNT: 14.6 % (ref 11–14.5)
ERYTHROCYTE [DISTWIDTH] IN BLOOD BY AUTOMATED COUNT: 15 % (ref 11–14.5)
ERYTHROCYTE [DISTWIDTH] IN BLOOD BY AUTOMATED COUNT: 15.4 % (ref 11–14.5)
ERYTHROCYTE [DISTWIDTH] IN BLOOD BY AUTOMATED COUNT: 15.7 % (ref 11–14.5)
ERYTHROCYTE [DISTWIDTH] IN BLOOD BY AUTOMATED COUNT: 16 % (ref 11–14.5)
FASTING STATUS PATIENT QL REPORTED: NO
GFR SERPL CREATININE-BSD FRML MDRD: 50 ML/MIN/1.73M2
GFR SERPL CREATININE-BSD FRML MDRD: >60 ML/MIN/1.73M2
GLUCOSE BLD-MCNC: 101 MG/DL (ref 70–125)
GLUCOSE BLD-MCNC: 105 MG/DL (ref 70–125)
GLUCOSE BLD-MCNC: 113 MG/DL (ref 70–125)
GLUCOSE BLD-MCNC: 114 MG/DL (ref 70–125)
GLUCOSE BLD-MCNC: 118 MG/DL (ref 70–125)
GLUCOSE BLD-MCNC: 118 MG/DL (ref 70–125)
GLUCOSE BLD-MCNC: 124 MG/DL (ref 70–125)
GLUCOSE BLD-MCNC: 127 MG/DL (ref 70–125)
GLUCOSE BLD-MCNC: 154 MG/DL (ref 70–125)
GLUCOSE BLD-MCNC: 154 MG/DL (ref 70–125)
GLUCOSE BLD-MCNC: 158 MG/DL (ref 70–125)
GLUCOSE BLD-MCNC: 86 MG/DL (ref 70–125)
GLUCOSE BLD-MCNC: 99 MG/DL (ref 70–125)
GLUCOSE BLDC GLUCOMTR-MCNC: 83 MG/DL (ref 70–139)
GLUCOSE BLDC GLUCOMTR-MCNC: 83 MG/DL (ref 70–139)
HBV CORE AB SERPL QL IA: NEGATIVE
HBV SURFACE AG SERPL QL IA: NEGATIVE
HCG-TM SERPL-ACNC: <3 IU/L
HCT VFR BLD AUTO: 19.3 % (ref 40–54)
HCT VFR BLD AUTO: 22.2 % (ref 40–54)
HCT VFR BLD AUTO: 22.3 % (ref 40–54)
HCT VFR BLD AUTO: 24.5 % (ref 40–54)
HCT VFR BLD AUTO: 24.6 % (ref 40–54)
HCT VFR BLD AUTO: 26 % (ref 40–54)
HCT VFR BLD AUTO: 27.9 % (ref 40–54)
HCT VFR BLD AUTO: 28.4 % (ref 40–54)
HCT VFR BLD AUTO: 30.5 % (ref 40–54)
HCT VFR BLD AUTO: 31.6 % (ref 40–54)
HCT VFR BLD AUTO: 36.2 % (ref 40–54)
HCT VFR BLD AUTO: 45 % (ref 40–54)
HDLC SERPL-MCNC: 37 MG/DL
HGB BLD-MCNC: 10.3 G/DL (ref 14–18)
HGB BLD-MCNC: 10.7 G/DL (ref 14–18)
HGB BLD-MCNC: 11.2 G/DL (ref 14–18)
HGB BLD-MCNC: 12.2 G/DL (ref 14–18)
HGB BLD-MCNC: 15.4 G/DL (ref 14–18)
HGB BLD-MCNC: 6.6 G/DL (ref 14–18)
HGB BLD-MCNC: 7.5 G/DL (ref 14–18)
HGB BLD-MCNC: 7.6 G/DL (ref 14–18)
HGB BLD-MCNC: 8 G/DL (ref 14–18)
HGB BLD-MCNC: 8.4 G/DL (ref 14–18)
HGB BLD-MCNC: 8.9 G/DL (ref 14–18)
HGB BLD-MCNC: 9.3 G/DL (ref 14–18)
HGB BLD-MCNC: 9.7 G/DL (ref 14–18)
IGA SERPL-MCNC: 2075 MG/DL
IGA SERPL-MCNC: 367 MG/DL (ref 65–400)
IGM SERPL-MCNC: 122 MG/DL (ref 60–280)
INR PPP: 1.13 (ref 0.9–1.1)
ISSUE DATE AND TIME: NORMAL
LAB AP CHARGES (HE HISTORICAL CONVERSION): ABNORMAL
LAB AP CHARGES (HE HISTORICAL CONVERSION): ABNORMAL
LAB AP INITIAL CYTO EVAL (HE HISTORICAL CONVERSION): ABNORMAL
LAB MED GENERAL PATH INTERP (HE HISTORICAL CONVERSION): ABNORMAL
LDH SERPL L TO P-CCNC: 202 U/L (ref 125–220)
LDH SERPL L TO P-CCNC: 203 U/L (ref 125–220)
LDH SERPL L TO P-CCNC: 326 U/L (ref 125–220)
LDH SERPL L TO P-CCNC: 368 U/L (ref 125–220)
LDLC SERPL CALC-MCNC: 45 MG/DL
LYMPHOCYTES # BLD AUTO: 0.1 THOU/UL (ref 0.8–4.4)
LYMPHOCYTES # BLD AUTO: 0.2 THOU/UL (ref 0.8–4.4)
LYMPHOCYTES # BLD AUTO: 0.5 THOU/UL (ref 0.8–4.4)
LYMPHOCYTES # BLD AUTO: 0.6 THOU/UL (ref 0.8–4.4)
LYMPHOCYTES # BLD AUTO: 0.6 THOU/UL (ref 0.8–4.4)
LYMPHOCYTES # BLD AUTO: 1 THOU/UL (ref 0.8–4.4)
LYMPHOCYTES # BLD AUTO: 1.2 THOU/UL (ref 0.8–4.4)
LYMPHOCYTES # BLD AUTO: 1.7 THOU/UL (ref 0.8–4.4)
LYMPHOCYTES # BLD AUTO: 2.5 THOU/UL (ref 0.8–4.4)
LYMPHOCYTES NFR BLD AUTO: 11 % (ref 20–40)
LYMPHOCYTES NFR BLD AUTO: 12 % (ref 20–40)
LYMPHOCYTES NFR BLD AUTO: 3 % (ref 20–40)
LYMPHOCYTES NFR BLD AUTO: 3 % (ref 20–40)
LYMPHOCYTES NFR BLD AUTO: 30 % (ref 20–40)
LYMPHOCYTES NFR BLD AUTO: 31 % (ref 20–40)
LYMPHOCYTES NFR BLD AUTO: 39 % (ref 20–40)
LYMPHOCYTES NFR BLD AUTO: 45 % (ref 20–40)
LYMPHOCYTES NFR BLD AUTO: 5 % (ref 20–40)
LYMPHOCYTES NFR BLD AUTO: 5 % (ref 20–40)
LYMPHOCYTES NFR BLD AUTO: 7 % (ref 20–40)
MANUAL NRBC PER 100 CELLS: 1
MCH RBC QN AUTO: 31.6 PG (ref 27–34)
MCH RBC QN AUTO: 31.7 PG (ref 27–34)
MCH RBC QN AUTO: 31.8 PG (ref 27–34)
MCH RBC QN AUTO: 32 PG (ref 27–34)
MCH RBC QN AUTO: 32.4 PG (ref 27–34)
MCH RBC QN AUTO: 32.7 PG (ref 27–34)
MCH RBC QN AUTO: 32.8 PG (ref 27–34)
MCH RBC QN AUTO: 32.8 PG (ref 27–34)
MCH RBC QN AUTO: 33 PG (ref 27–34)
MCH RBC QN AUTO: 33 PG (ref 27–34)
MCH RBC QN AUTO: 33.1 PG (ref 27–34)
MCH RBC QN AUTO: 34.3 PG (ref 27–34)
MCHC RBC AUTO-ENTMCNC: 32.7 G/DL (ref 32–36)
MCHC RBC AUTO-ENTMCNC: 32.7 G/DL (ref 32–36)
MCHC RBC AUTO-ENTMCNC: 33.6 G/DL (ref 32–36)
MCHC RBC AUTO-ENTMCNC: 33.7 G/DL (ref 32–36)
MCHC RBC AUTO-ENTMCNC: 33.8 G/DL (ref 32–36)
MCHC RBC AUTO-ENTMCNC: 33.9 G/DL (ref 32–36)
MCHC RBC AUTO-ENTMCNC: 34.1 G/DL (ref 32–36)
MCHC RBC AUTO-ENTMCNC: 34.2 G/DL (ref 32–36)
MCHC RBC AUTO-ENTMCNC: 34.8 G/DL (ref 32–36)
MCV RBC AUTO: 100 FL (ref 80–100)
MCV RBC AUTO: 93 FL (ref 80–100)
MCV RBC AUTO: 95 FL (ref 80–100)
MCV RBC AUTO: 96 FL (ref 80–100)
MCV RBC AUTO: 96 FL (ref 80–100)
MCV RBC AUTO: 97 FL (ref 80–100)
MCV RBC AUTO: 97 FL (ref 80–100)
MCV RBC AUTO: 98 FL (ref 80–100)
METAMYELOCYTES (ABSOLUTE): 0.1 THOU/UL
METAMYELOCYTES (ABSOLUTE): 1.5 THOU/UL
METAMYELOCYTES NFR BLD MANUAL: 1 %
METAMYELOCYTES NFR BLD MANUAL: 12 %
MISCELLANEOUS TEST DEPT. - HE HISTORICAL: NORMAL
MONOCYTES # BLD AUTO: 0 THOU/UL (ref 0–0.9)
MONOCYTES # BLD AUTO: 0.1 THOU/UL (ref 0–0.9)
MONOCYTES # BLD AUTO: 0.3 THOU/UL (ref 0–0.9)
MONOCYTES # BLD AUTO: 0.5 THOU/UL (ref 0–0.9)
MONOCYTES # BLD AUTO: 0.7 THOU/UL (ref 0–0.9)
MONOCYTES # BLD AUTO: 0.9 THOU/UL (ref 0–0.9)
MONOCYTES # BLD AUTO: 1 THOU/UL (ref 0–0.9)
MONOCYTES # BLD AUTO: 1.1 THOU/UL (ref 0–0.9)
MONOCYTES # BLD AUTO: 1.6 THOU/UL (ref 0–0.9)
MONOCYTES NFR BLD AUTO: 0 % (ref 2–10)
MONOCYTES NFR BLD AUTO: 11 % (ref 2–10)
MONOCYTES NFR BLD AUTO: 12 % (ref 2–10)
MONOCYTES NFR BLD AUTO: 16 % (ref 2–10)
MONOCYTES NFR BLD AUTO: 18 % (ref 2–10)
MONOCYTES NFR BLD AUTO: 30 % (ref 2–10)
MONOCYTES NFR BLD AUTO: 31 % (ref 2–10)
MONOCYTES NFR BLD AUTO: 4 % (ref 2–10)
MONOCYTES NFR BLD AUTO: 7 % (ref 2–10)
MUGA LV EJECTION FRACTION: 59.6 %
MYELOCYTES (ABSOLUTE): 0.3 THOU/UL
MYELOCYTES (ABSOLUTE): 0.9 THOU/UL
MYELOCYTES NFR BLD MANUAL: 3 %
MYELOCYTES NFR BLD MANUAL: 7 %
NEUTROPHILS # BLD AUTO: 0.1 THOU/UL (ref 2–7.7)
NEUTROPHILS # BLD AUTO: 2.4 THOU/UL (ref 2–7.7)
NEUTROPHILS # BLD AUTO: 2.8 THOU/UL (ref 2–7.7)
NEUTROPHILS # BLD AUTO: 3.7 THOU/UL (ref 2–7.7)
NEUTROPHILS # BLD AUTO: 6.8 THOU/UL (ref 2–7.7)
NEUTROPHILS NFR BLD AUTO: 24 % (ref 50–70)
NEUTROPHILS NFR BLD AUTO: 43 % (ref 50–70)
NEUTROPHILS NFR BLD AUTO: 43 % (ref 50–70)
NEUTROPHILS NFR BLD AUTO: 66 % (ref 50–70)
NEUTROPHILS NFR BLD AUTO: 84 % (ref 50–70)
OVALOCYTES: ABNORMAL
PATH REPORT.ADDENDUM SPEC: ABNORMAL
PATH REPORT.COMMENTS IMP SPEC: ABNORMAL
PATH REPORT.FINAL DX SPEC: ABNORMAL
PATH REPORT.FINAL DX SPEC: ABNORMAL
PATH REPORT.MICROSCOPIC SPEC OTHER STN: ABNORMAL
PATH REPORT.RELEVANT HX SPEC: ABNORMAL
PERFORMING LAB: NORMAL
PHOSPHATE SERPL-MCNC: 1.8 MG/DL (ref 2.5–4.5)
PHOSPHATE SERPL-MCNC: 2.5 MG/DL (ref 2.5–4.5)
PHOSPHATE SERPL-MCNC: 2.5 MG/DL (ref 2.5–4.5)
PHOSPHATE SERPL-MCNC: 2.7 MG/DL (ref 2.5–4.5)
PHOSPHATE SERPL-MCNC: 2.7 MG/DL (ref 2.5–4.5)
PHOSPHATE SERPL-MCNC: 2.9 MG/DL (ref 2.5–4.5)
PHOSPHATE SERPL-MCNC: 2.9 MG/DL (ref 2.5–4.5)
PHOSPHATE SERPL-MCNC: 3.6 MG/DL (ref 2.5–4.5)
PHOSPHATE SERPL-MCNC: 4 MG/DL (ref 2.5–4.5)
PHOSPHATE SERPL-MCNC: 4 MG/DL (ref 2.5–4.5)
PLAT MORPH BLD: ABNORMAL
PLAT MORPH BLD: NORMAL
PLATELET # BLD AUTO: 110 THOU/UL (ref 140–440)
PLATELET # BLD AUTO: 176 THOU/UL (ref 140–440)
PLATELET # BLD AUTO: 177 THOU/UL (ref 140–440)
PLATELET # BLD AUTO: 195 THOU/UL (ref 140–440)
PLATELET # BLD AUTO: 199 THOU/UL (ref 140–440)
PLATELET # BLD AUTO: 226 THOU/UL (ref 140–440)
PLATELET # BLD AUTO: 279 THOU/UL (ref 140–440)
PLATELET # BLD AUTO: 283 THOU/UL (ref 140–440)
PLATELET # BLD AUTO: 295 THOU/UL (ref 140–440)
PLATELET # BLD AUTO: 313 THOU/UL (ref 140–440)
PLATELET # BLD AUTO: 33 THOU/UL (ref 140–440)
PLATELET # BLD AUTO: 595 THOU/UL (ref 140–440)
PLATELET # BLD AUTO: 61 THOU/UL (ref 140–440)
PMV BLD AUTO: 10.1 FL (ref 8.5–12.5)
PMV BLD AUTO: 10.7 FL (ref 8.5–12.5)
PMV BLD AUTO: 11 FL (ref 8.5–12.5)
PMV BLD AUTO: 11.2 FL (ref 8.5–12.5)
PMV BLD AUTO: 11.2 FL (ref 8.5–12.5)
PMV BLD AUTO: 12.8 FL (ref 8.5–12.5)
PMV BLD AUTO: 7.4 FL (ref 7–10)
PMV BLD AUTO: 9 FL (ref 8.5–12.5)
PMV BLD AUTO: 9.1 FL (ref 8.5–12.5)
PMV BLD AUTO: 9.4 FL (ref 8.5–12.5)
PMV BLD AUTO: 9.6 FL (ref 8.5–12.5)
PMV BLD AUTO: 9.9 FL (ref 8.5–12.5)
POLYCHROMASIA BLD QL SMEAR: ABNORMAL
POTASSIUM BLD-SCNC: 3 MMOL/L (ref 3.5–5)
POTASSIUM BLD-SCNC: 3 MMOL/L (ref 3.5–5)
POTASSIUM BLD-SCNC: 3.2 MMOL/L (ref 3.5–5)
POTASSIUM BLD-SCNC: 3.3 MMOL/L (ref 3.5–5)
POTASSIUM BLD-SCNC: 3.5 MMOL/L (ref 3.5–5)
POTASSIUM BLD-SCNC: 3.6 MMOL/L (ref 3.5–5)
POTASSIUM BLD-SCNC: 3.7 MMOL/L (ref 3.5–5)
POTASSIUM BLD-SCNC: 4 MMOL/L (ref 3.5–5)
POTASSIUM BLD-SCNC: 4.1 MMOL/L (ref 3.5–5)
POTASSIUM BLD-SCNC: 4.2 MMOL/L (ref 3.5–5)
POTASSIUM BLD-SCNC: 4.6 MMOL/L (ref 3.5–5)
PROT SERPL-MCNC: 5.9 G/DL (ref 6–8)
PROT SERPL-MCNC: 6 G/DL (ref 6–8)
PROT SERPL-MCNC: 7.3 G/DL (ref 6–8)
PROT SERPL-MCNC: 7.6 G/DL (ref 6–8)
PROT SERPL-MCNC: 8.2 G/DL (ref 6–8)
RBC # BLD AUTO: 2.02 MILL/UL (ref 4.4–6.2)
RBC # BLD AUTO: 2.36 MILL/UL (ref 4.4–6.2)
RBC # BLD AUTO: 2.4 MILL/UL (ref 4.4–6.2)
RBC # BLD AUTO: 2.53 MILL/UL (ref 4.4–6.2)
RBC # BLD AUTO: 2.56 MILL/UL (ref 4.4–6.2)
RBC # BLD AUTO: 2.75 MILL/UL (ref 4.4–6.2)
RBC # BLD AUTO: 2.91 MILL/UL (ref 4.4–6.2)
RBC # BLD AUTO: 2.94 MILL/UL (ref 4.4–6.2)
RBC # BLD AUTO: 3.14 MILL/UL (ref 4.4–6.2)
RBC # BLD AUTO: 3.24 MILL/UL (ref 4.4–6.2)
RBC # BLD AUTO: 3.69 MILL/UL (ref 4.4–6.2)
RBC # BLD AUTO: 4.49 MILL/UL (ref 4.4–6.2)
RESULT FLAG (HE HISTORICAL CONVERSION): ABNORMAL
SCHISTOCYTES: ABNORMAL
SODIUM SERPL-SCNC: 135 MMOL/L (ref 136–145)
SODIUM SERPL-SCNC: 135 MMOL/L (ref 136–145)
SODIUM SERPL-SCNC: 136 MMOL/L (ref 136–145)
SODIUM SERPL-SCNC: 137 MMOL/L (ref 136–145)
SODIUM SERPL-SCNC: 137 MMOL/L (ref 136–145)
SODIUM SERPL-SCNC: 138 MMOL/L (ref 136–145)
SODIUM SERPL-SCNC: 139 MMOL/L (ref 136–145)
SODIUM SERPL-SCNC: 139 MMOL/L (ref 136–145)
SODIUM SERPL-SCNC: 141 MMOL/L (ref 136–145)
SODIUM SERPL-SCNC: 143 MMOL/L (ref 136–145)
SPECIMEN DESCRIPTION: ABNORMAL
SPECIMEN STATUS: NORMAL
STATUS (HISTORICAL CONVERSION): NORMAL
TARGETS BLD QL SMEAR: ABNORMAL
TEST NAME: NORMAL
TOTAL NEUTROPHILS-ABS(DIFF): 1.2 THOU/UL (ref 2–7.7)
TOTAL NEUTROPHILS-ABS(DIFF): 19.1 THOU/UL (ref 2–7.7)
TOTAL NEUTROPHILS-ABS(DIFF): 31 THOU/UL (ref 2–7.7)
TOTAL NEUTROPHILS-ABS(DIFF): 6.7 THOU/UL (ref 2–7.7)
TOTAL NEUTROPHILS-ABS(DIFF): 7.1 THOU/UL (ref 2–7.7)
TOTAL NEUTROPHILS-ABS(DIFF): 9 THOU/UL (ref 2–7.7)
TOTAL NEUTROPHILS-REL(DIFF): 39 % (ref 50–70)
TOTAL NEUTROPHILS-REL(DIFF): 72 % (ref 50–70)
TOTAL NEUTROPHILS-REL(DIFF): 72 % (ref 50–70)
TOTAL NEUTROPHILS-REL(DIFF): 92 % (ref 50–70)
TOTAL NEUTROPHILS-REL(DIFF): 96 % (ref 50–70)
TOTAL NEUTROPHILS-REL(DIFF): 97 % (ref 50–70)
TRIGL SERPL-MCNC: 400 MG/DL
UNIT ABO/RH (HISTORICAL CONVERSION): NORMAL
UNIT NUMBER: NORMAL
URATE SERPL-MCNC: 5.4 MG/DL (ref 3–8)
URATE SERPL-MCNC: 5.4 MG/DL (ref 3–8)
URATE SERPL-MCNC: 5.9 MG/DL (ref 3–8)
URATE SERPL-MCNC: 6.7 MG/DL (ref 3–8)
URATE SERPL-MCNC: 6.8 MG/DL (ref 3–8)
URATE SERPL-MCNC: 7 MG/DL (ref 3–8)
URATE SERPL-MCNC: 7.1 MG/DL (ref 3–8)
URATE SERPL-MCNC: 8.3 MG/DL (ref 3–8)
WBC: 0.3 THOU/UL (ref 4–11)
WBC: 0.5 THOU/UL (ref 4–11)
WBC: 10.3 THOU/UL (ref 4–11)
WBC: 12.5 THOU/UL (ref 4–11)
WBC: 19.7 THOU/UL (ref 4–11)
WBC: 3.1 THOU/UL (ref 4–11)
WBC: 32.3 THOU/UL (ref 4–11)
WBC: 4.5 THOU/UL (ref 4–11)
WBC: 5.6 THOU/UL (ref 4–11)
WBC: 6.5 THOU/UL (ref 4–11)
WBC: 7.7 THOU/UL (ref 4–11)
WBC: 9.3 THOU/UL (ref 4–11)

## 2019-01-01 RX ORDER — POLYVINYL ALCOHOL 14 MG/ML
2 SOLUTION/ DROPS OPHTHALMIC PRN
Status: SHIPPED | COMMUNITY
Start: 2019-01-01

## 2019-01-01 RX ORDER — LORATADINE 10 MG/1
10 TABLET ORAL DAILY PRN
Refills: 9 | Status: SHIPPED | COMMUNITY
Start: 2019-01-01

## 2019-01-01 RX ORDER — ACETAMINOPHEN 500 MG
500-1000 TABLET ORAL EVERY 6 HOURS PRN
Qty: 120 TABLET | Refills: 0 | Status: SHIPPED | OUTPATIENT
Start: 2019-01-01

## 2019-01-01 RX ORDER — LIDOCAINE 50 MG/G
1 OINTMENT TOPICAL PRN
Status: SHIPPED | COMMUNITY
Start: 2019-01-01

## 2019-01-01 RX ORDER — LOSARTAN POTASSIUM AND HYDROCHLOROTHIAZIDE 12.5; 5 MG/1; MG/1
TABLET ORAL
Qty: 30 TABLET | Refills: 10 | Status: SHIPPED | OUTPATIENT
Start: 2019-01-01

## 2019-01-01 RX ORDER — ALLOPURINOL 100 MG/1
200 TABLET ORAL DAILY
Qty: 180 TABLET | Refills: 3 | Status: SHIPPED | OUTPATIENT
Start: 2019-01-01

## 2019-01-01 RX ORDER — INDOMETHACIN 50 MG/1
CAPSULE ORAL
Qty: 30 CAPSULE | Refills: 10 | Status: SHIPPED | OUTPATIENT
Start: 2019-01-01

## 2019-01-01 SDOH — SOCIAL STABILITY - SOCIAL INSECURITY: ACCULTURATION DIFFICULTY: Z60.3

## 2019-01-01 SDOH — ECONOMIC STABILITY - INCOME SECURITY: LOW INCOME: Z59.6

## 2019-01-01 ASSESSMENT — MIFFLIN-ST. JEOR
SCORE: 1272.34
SCORE: 1265.52
SCORE: 1278
SCORE: 1321.38
SCORE: 1248.75
SCORE: 1302.28
SCORE: 1293.21
SCORE: 1300.92
SCORE: 1300.51
SCORE: 1305.86
SCORE: 1293.33

## 2019-10-01 ENCOUNTER — COMMUNICATION - HEALTHEAST (OUTPATIENT)
Dept: GERIATRIC MEDICINE | Facility: CLINIC | Age: 79
End: 2019-10-01

## 2020-07-21 ENCOUNTER — AMBULATORY - HEALTHEAST (OUTPATIENT)
Dept: FAMILY MEDICINE | Facility: CLINIC | Age: 80
End: 2020-07-21

## 2020-08-27 ENCOUNTER — AMBULATORY - HEALTHEAST (OUTPATIENT)
Dept: FAMILY MEDICINE | Facility: CLINIC | Age: 80
End: 2020-08-27

## 2020-08-27 DIAGNOSIS — Z59.71 INSURANCE COVERAGE PROBLEMS: ICD-10-CM

## 2020-08-27 DIAGNOSIS — Z59.9 FINANCIAL PROBLEMS: ICD-10-CM

## 2020-08-27 SDOH — ECONOMIC STABILITY - INCOME SECURITY: PROBLEM RELATED TO HOUSING AND ECONOMIC CIRCUMSTANCES, UNSPECIFIED: Z59.9

## 2021-05-26 VITALS
DIASTOLIC BLOOD PRESSURE: 56 MMHG | HEART RATE: 92 BPM | SYSTOLIC BLOOD PRESSURE: 104 MMHG | TEMPERATURE: 98.3 F | OXYGEN SATURATION: 97 %

## 2021-05-26 VITALS
DIASTOLIC BLOOD PRESSURE: 59 MMHG | RESPIRATION RATE: 20 BRPM | OXYGEN SATURATION: 96 % | TEMPERATURE: 97.4 F | HEART RATE: 81 BPM | SYSTOLIC BLOOD PRESSURE: 95 MMHG

## 2021-05-27 NOTE — TELEPHONE ENCOUNTER
Refill Approved    Rx renewed per Medication Renewal Policy. Medication was last renewed on 4/3/18.    Della Solomon, Care [facility] Triage/Med Refill 3/27/2019     Requested Prescriptions   Pending Prescriptions Disp Refills     losartan-hydrochlorothiazide (HYZAAR) 50-12.5 mg per tablet [Pharmacy Med Name: LOSARTAN-HCTZ 50-12.5 MG TA 50-12.5 TAB] 30 tablet 10     Sig: TAKE 1 PILL BY MOUTH DAILY FOR HIGH BLOOD PRESSURE.    Diuretics/Combination Diuretics Refill Protocol  Passed - 3/26/2019  3:26 PM       Passed - Visit with PCP or prescribing provider visit in past 12 months    Last office visit with prescriber/PCP: 2/12/2019 Mikki Jiménez MD OR same dept: 2/12/2019 Mikki Jiménez MD OR same specialty: 2/12/2019 Mikki Jiménez MD  Last physical: Visit date not found Last MTM visit: Visit date not found   Next visit within 3 mo: Visit date not found  Next physical within 3 mo: Visit date not found  Prescriber OR PCP: Mikki Jiménez MD  Last diagnosis associated with med order: 1. HTN (hypertension)  - losartan-hydrochlorothiazide (HYZAAR) 50-12.5 mg per tablet [Pharmacy Med Name: LOSARTAN-HCTZ 50-12.5 MG TA 50-12.5 TAB]; TAKE 1 PILL BY MOUTH DAILY FOR HIGH BLOOD PRESSURE.  Dispense: 30 tablet; Refill: 10    If protocol passes may refill for 12 months if within 3 months of last provider visit (or a total of 15 months).            Passed - Serum Potassium in past 12 months     Lab Results   Component Value Date    Potassium 4.1 02/12/2019            Passed - Serum Sodium in past 12 months     Lab Results   Component Value Date    Sodium 139 02/12/2019            Passed - Blood pressure on file in past 12 months    BP Readings from Last 1 Encounters:   02/12/19 122/66            Passed - Serum Creatinine in past 12 months     Creatinine   Date Value Ref Range Status   02/12/2019 1.04 0.70 - 1.30 mg/dL Final

## 2021-05-29 NOTE — PROGRESS NOTES
Assessment: /    Plan:    1. Scrotal mass  US Scrotum and Testicles W Duplex Ltd   2. Inguinal swelling  CT Pelvis WIthout Oral Without IV Contrast       I explained to the patient and his family member the findings, and that these are concerning.  Further follow-up depending upon imaging results.  Patient was seen with professional Leyla , Ashish Sousa.      Subjective:    HPI:  Chester Gordon is an 79-year-old male presenting with bilateral groin swelling.  He noticed this several days ago.  He has not had any pain.  He did not have any trauma to the area.      Review of Systems: No dysuria, no fever, normal bowel function.      Current Outpatient Medications   Medication Sig Dispense Refill     acetaminophen (MAPAP EXTRA STRENGTH) 500 MG tablet Take 1 tablet (500 mg total) by mouth every 6 (six) hours as needed for pain. 90 tablet 4     allopurinol (ZYLOPRIM) 100 MG tablet Take 2 tablets (200 mg total) by mouth daily. 180 tablet 4     indomethacin (INDOCIN) 50 MG capsule TAKE 1 CAPSULE (50 MG TOTAL) BY MOUTH 2 (TWO) TIMES A DAY FOR GOUT FLARES 30 capsule 10     lidocaine (XYLOCAINE) 5 % ointment Apply to painful joint three times a day as needed for pain 35.44 g 2     loratadine (CLARITIN) 10 mg tablet Take 1 tablet by mouth daily.  3     losartan-hydrochlorothiazide (HYZAAR) 50-12.5 mg per tablet TAKE 1 PILL BY MOUTH DAILY FOR HIGH BLOOD PRESSURE. 30 tablet 10     polyvinyl alcohol (LIQUIFILM TEARS) 1.4 % ophthalmic solution Administer 2 drops to both eyes as needed.       TAB-A-MARK per tablet TAKE 1 TABLET BY MOUTH DAILY. 100 tablet 3     VITAMIN D3 2,000 unit capsule TAKE 1 PILL (2,000 UNITS) BY MOUTH EVERY DAY 90 capsule 3     No current facility-administered medications for this visit.          Objective:    Vitals:    06/10/19 1032   BP: 128/62   Pulse: 76   Resp: 16   Temp: 98.5  F (36.9  C)   SpO2: 93%       Gen:  NAD, VSS. Using a cane  Lungs:  normal  Heart:  normal  Abdomen:  No HSM, mass or  tenderness  : Penis is normal.  Left testis is normal.  Right testis is hard, and has a 2 cm hard mass of the epididymis.  No hernia.  There is 5 inch x 2 inch firm swelling along the right inguinal ligament, consistent with lymphadenopathy.  3 inch x 1 inch firm swelling along the left inguinal ligament.        ADDITIONAL HISTORY SUMMARIZED (2): None.  DECISION TO OBTAIN EXTRA INFORMATION (1): None.   RADIOLOGY TESTS (1): None.  LABS (1): None.  MEDICINE TESTS (1): None.  INDEPENDENT REVIEW (2 each): None.     Total Data Points: 0

## 2021-05-29 NOTE — PROGRESS NOTES
Assessment: /    Plan:    1. Testicular mass  Ambulatory referral to Urology   2. Inguinal lymphadenopathy  Ambulatory referral to Urology       I discussed by phone with Dr. Gimenez.  Our specialty  is assisting the patient to have an appointment as soon as possible, contacting Dr. Gimenez's assistant..    I explained the results to the patient and his daughter.  This is very likely testicular cancer that has spread.    Patient was seen with professional Leyla , Steffi.      Subjective:    HPI:  Chester Gordon is an 79-year-old male presenting for follow-up on ultrasound and CT.    He states that the inguinal areas have a burning type pain.      Social Hx:  He is accompanied by his daughter.    Review of Systems:  No fever or dysuria.      Current Outpatient Medications   Medication Sig Dispense Refill     acetaminophen (MAPAP EXTRA STRENGTH) 500 MG tablet Take 1 tablet (500 mg total) by mouth every 6 (six) hours as needed for pain. 90 tablet 4     allopurinol (ZYLOPRIM) 100 MG tablet Take 2 tablets (200 mg total) by mouth daily. 180 tablet 4     indomethacin (INDOCIN) 50 MG capsule TAKE 1 CAPSULE (50 MG TOTAL) BY MOUTH 2 (TWO) TIMES A DAY FOR GOUT FLARES 30 capsule 10     lidocaine (XYLOCAINE) 5 % ointment Apply to painful joint three times a day as needed for pain 35.44 g 2     loratadine (CLARITIN) 10 mg tablet Take 1 tablet by mouth daily.  3     losartan-hydrochlorothiazide (HYZAAR) 50-12.5 mg per tablet TAKE 1 PILL BY MOUTH DAILY FOR HIGH BLOOD PRESSURE. 30 tablet 10     polyvinyl alcohol (LIQUIFILM TEARS) 1.4 % ophthalmic solution Administer 2 drops to both eyes as needed.       TAB-A-MARK per tablet TAKE 1 TABLET BY MOUTH DAILY. 100 tablet 3     VITAMIN D3 2,000 unit capsule TAKE 1 PILL (2,000 UNITS) BY MOUTH EVERY DAY 90 capsule 3     No current facility-administered medications for this visit.          Objective:    Vitals:    06/17/19 1428   BP: 122/72   Pulse: 80   Resp: 20   Temp: 97.9   F (36.6  C)   SpO2: 93%       Gen:  NAD, VSS.  Using a cane    Ultrasound demonstrates multiple solid areas within the right testicle.  CT demonstrates extensive inguinal and iliac adenopathy.      ADDITIONAL HISTORY SUMMARIZED (2): None.  DECISION TO OBTAIN EXTRA INFORMATION (1): None.   RADIOLOGY TESTS (1): Reviewed CT, U/S.  LABS (1): None.  MEDICINE TESTS (1): None.  INDEPENDENT REVIEW (2 each): None.     Total Data Points: 1

## 2021-05-29 NOTE — PROGRESS NOTES
"Per CC, mailed client an \"Unable to Contact\" letter.    Per CC, continues to be unable to contact member for 6 month assessment.    Mary Iglesias  Care Management Specialist  [facility]  [phone]    "

## 2021-05-29 NOTE — TELEPHONE ENCOUNTER
RN cannot approve Refill Request    RN can NOT refill this medication med is not covered by policy/route to provider. Last office visit: 5/31/2017 Herbie Bergman MD Last Physical: Visit date not found Last MTM visit: Visit date not found Last visit same specialty: 6/17/2019 Real Johnson MD.  Next visit within 3 mo: Visit date not found  Next physical within 3 mo: Visit date not found      Nancy Palacio, Care Connection Triage/Med Refill 6/20/2019    Requested Prescriptions   Pending Prescriptions Disp Refills     acetaminophen (TYLENOL) 500 MG tablet [Pharmacy Med Name: ACETAMINOPHEN 500 MG TABS 500 TAB] 90 tablet 4     Sig: TAKE 1 TABLET (500 MG TOTAL) BY MOUTH EVERY 6 (SIX) HOURS AS NEEDED FOR PAIN.       There is no refill protocol information for this order

## 2021-05-29 NOTE — PROGRESS NOTES
[facility] Care Coordination Contact    Called member to complete six month assessment but phone line was busy each time writer tried contacting member.  Member's emergency contact, Bran rBiggs's ([phone]) phone was unable to receive any voicemails at this time.  Previous attempts were on 5-21-19 and 5-24-19.      Writer to task CMS to send [id number] letter to member.     Rubi Zuniga RN  [facility]  [phone]

## 2021-05-29 NOTE — TELEPHONE ENCOUNTER
I called Chester to schedule consult with Dr. Rodriguez for his dx of testicular cancer, referred by Dr. Gimenez.  I called via the language line.  His son states his father is hard of hearing and difficult to speak on the phone.  I have scheduled his appointment for Wed, 7/17/19, 12:15.  They will arrive to complete paperwork with  which I indicated in appt notes.  I will mail a welcome letter to patient at their request.  I gave them my number and reviewed my role.

## 2021-05-30 VITALS — BODY MASS INDEX: 26.29 KG/M2 | HEIGHT: 64 IN | WEIGHT: 154 LBS

## 2021-05-30 VITALS — BODY MASS INDEX: 25.99 KG/M2 | HEIGHT: 64 IN | WEIGHT: 152.25 LBS

## 2021-05-30 NOTE — H&P
[facility] Radiology History and Physical Note    Procedure Requested: Port placement   Requesting Provider: Dr. Rodriguez     HPI: Chester Gordon is a 79 y.o. old male with a history of HTN, hyperlipidemia and diffuse large B cell lymphoma that presents for port placement that presents for port placement for treatment.      Patient seen with his daughter and  at the bedside.      NPO Status: Midnight   Anticoagulation/Antiplatelets/Bleeding tendencies: None   Antibiotics: Ancef dose ordered pre procedure       PAST MEDICAL HISTORY:   Past Medical History:   Diagnosis Date     Cognitive disorder      Gout      Hyperlipidemia      Hypertension      Lymphoma (H)        PAST SURGICAL HISTORY:  Past Surgical History:   Procedure Laterality Date     US BIOPSY FINE NEEDLE ASPIRATION LYMPH NODE  7/12/2019     US LYMPH NODE BIOPSY  7/12/2019       ALLERGIES:  No known drug allergies    MEDICATIONS:  Current Outpatient Medications   Medication Sig Dispense Refill     acetaminophen (TYLENOL) 500 MG tablet TAKE 1 TABLET (500 MG TOTAL) BY MOUTH EVERY 6 (SIX) HOURS AS NEEDED FOR PAIN. 90 tablet 0     allopurinol (ZYLOPRIM) 100 MG tablet Take 2 tablets (200 mg total) by mouth daily. 180 tablet 4     indomethacin (INDOCIN) 50 MG capsule TAKE 1 CAPSULE (50 MG TOTAL) BY MOUTH 2 (TWO) TIMES A DAY FOR GOUT FLARES 30 capsule 10     lidocaine (XYLOCAINE) 5 % ointment Apply to painful joint three times a day as needed for pain 35.44 g 2     loratadine (CLARITIN) 10 mg tablet TAKE 1 PILL BY MOUTH DAILY AS NEEDED FOR ALLERGY 30 tablet 9     losartan-hydrochlorothiazide (HYZAAR) 50-12.5 mg per tablet TAKE 1 PILL BY MOUTH DAILY FOR HIGH BLOOD PRESSURE. 30 tablet 10     polyvinyl alcohol (LIQUIFILM TEARS) 1.4 % ophthalmic solution Administer 2 drops to both eyes as needed.       TAB-A-MARK per tablet TAKE 1 TABLET BY MOUTH DAILY. 100 tablet 3     VITAMIN D3 2,000 unit capsule TAKE 1 PILL (2,000 UNITS) BY MOUTH EVERY DAY 90 capsule 3  "    Current Facility-Administered Medications   Medication Dose Route Frequency Provider Last Rate Last Dose     ceFAZolin 2 g in 100 mL in D5W (ANCEF)  2 g Intravenous 30 Min Pre-Op Shante Leonard PA-C         sodium chloride bacteriostatic 0.9 % injection 0.1-0.3 mL  0.1-0.3 mL Subcutaneous PRN Shante eLonard PA-C         sodium chloride flush 3 mL (NS)  3 mL Intravenous Line Care Shante Leonard PA-C           LABS:  INR (no units)   Date Value   07/22/2019 1.13 (H)     Hemoglobin (g/dL)   Date Value   07/22/2019 11.2 (L)     Platelets (thou/uL)   Date Value   07/22/2019 283       EXAM:  /67   Pulse 74   Temp 97.9  F (36.6  C) (Oral)   Resp 16   Ht 5' 3\" (1.6 m)   Wt 155 lb (70.3 kg)   SpO2 99%   BMI 27.46 kg/m    General: Stable. In no acute distress.  Neuro: A&O x 3. Moves all extremities equally.  Resp: Lungs clear to auscultation bilaterally.  Cardio: S1S2 and reg, without murmur, clicks or rubs  Skin: Upper chest clean and clear.    MSK: No gross motor weakness. Sensation intact.     Pre-Sedation Assessment:  Mallampati Airway Classification: Class 3: soft and hard palates clearly visible  Previous reaction to anesthesia/sedation: no  Sedation plan based on assessment: Moderate  Sleep Apnea: no  Dentures: no  COPD: no  ASA Classification: ASA 3 - Patient with moderate systemic disease with functional limitations  Comments: no hx of asthma       ASSESSMENT: 79 y.o. old male with diffuse large B cell lymphoma that presents for port placement that presents for port placement for treatment.        PLAN: Right chest port placement with sedation.      The procedure, risks and moderate sedation were discussed with the patient, all questions answered and patient agrees to proceed with the procedure. Written consent obtained.    Teresa CONDE, CNP  [facility]: Interventional Radiology   [phone]  "

## 2021-05-30 NOTE — PROGRESS NOTES
[facility] Care Coordination Contact     CC received notification of Hospital admission.  Hospital admission occurred on 7-29-19 at [facility] with Dx of Diffuse large B cell lymphoma (H)     CC reached out to member regarding transition and left a message requesting a return call.  Reviewed and update care plan as needed.  Notified community service providers and placed services [facility] [employer] [facility] transportation on hold as needed.  Transition log initiated.   PCP notified of hospitalization via EMR.    TRANSITIONS OF CARE (TYRA) LOG   TYRA tasks should be completed by the CC within one (1) business day of notification of each transition. Follow up contact with member is required after return to their usual care setting.  Note:  If CC finds out about the transitions fifteen (15) days or more after the member has returned to their usual care setting, no TYRA log is needed. However, the CC should check in with the member to discuss the transition process, any changes needed to the care plan and document it in a case note.    Member Name:  Chester Gordon [facility] Name:  [facility]/Health Plan Member ID#: [id number]   Product: [employer] Care Coordinator Contact:  Rubi Zuniga RN Agency/[facility]/Care System: [facility]   Transition Communication Actions from Care Management Contact   Transition #1   Notification Date: 7-29-19 Transition Date:   7-29-19 Transition From: Home     Is this the member s usual care setting?               yes Transition To: Hospital, [facility]   Transition Type:  Planned  Reason for Admission/Comments:  Diffuse large B cell lymphoma (H)      Shared CC contact info, care plan/services with receiving setting--Date completed: 7-29-19    Notified PCP of transition--Date completed:  7-29-19     via  EMR   Transition #2   Transition #3  (if applicable)   Notification Date: 8-2-19          Transition To:  Home  Transition Date: 8-2-19      Transition Type:     Planned  Notified PCP -- Date completed: 8-2-19               Shared CC contact info, care plan/services with receiving setting or, if applicable, home care agency--Date completed:  n/a   *Complete additional tasks below, if this transition is a return to usual care setting.      Comments:       Notification Date:           Transition To:  n/a  Transition Date:               Transition Type:    Planned  Notified PCP--Date completed:           Shared CC contact info, care plan/services with receiving setting or, if applicable, home care agency--Date completed:        *Complete additional tasks below, if this transition is a return to usual care setting.      Comments:        *Complete tasks below when the member is discharging TO their usual care setting within one (1) business day of notification.  For situations where the Care Coordinator is notified of the discharge prior to the date of discharge, the Care Coordinator must follow up with the member or designated representative to confirm that discharge actually occurred and discuss required TYRA tasks as outlined in the TYRA Instructions.  (This includes situations where it may be a  new  usual care setting for the member. (i.e., a community member who decides upon permanent nursing home placement following hospitalization and rehab).    Date completed:  8-2-19  Communicated with member or their designated representative about the following:  care transition process; about changes to the member s health status; plan of care updates; education about transitions and how to prevent unplanned transitions/readmissions  Four Pillars for Optimal Transition:    Check  Yes  - if the member, family member and/or SNF/facility staff manages the following:    If  No  provide explanation in the comments section.          [x]  Yes     []  No     Does the member have a follow-up appointment scheduled with primary care or specialist? (Mental health hospitalizations--the appt. should  be w/in 7 days)   [x]  Yes     []  No     Can the member manage their medications or is there a system in place to manage medications (e.g. home care set-up)?         [x]  Yes     []  No     Can the member verbalize warning signs and symptoms to watch for and how to respond?         [x]  Yes     []  No     Does the member use a Personal Health Care Record?  Check  Yes  if visit summary, discharge summary, and/or healthcare summary are being used as a PHR.                                                                                                                                                                                    [x] Yes      [] No      Have you updated the member s care plan?  If  No  provide explanation in comments.   Comments:

## 2021-05-30 NOTE — TELEPHONE ENCOUNTER
RN cannot approve Refill Request    RN can NOT refill this medication historical medication requested.       Della Solomon, Care Connection Triage/Med Refill 7/16/2019    Requested Prescriptions   Pending Prescriptions Disp Refills     loratadine (CLARITIN) 10 mg tablet [Pharmacy Med Name: LORATADINE 10 MG TABLET 10 TAB] 30 tablet 9     Sig: TAKE 1 PILL BY MOUTH DAILY AS NEEDED FOR ALLERGY       Antihistamine Refill Protocol Passed - 7/16/2019  2:40 PM        Passed - Patient has had office visit/physical in last year     Last office visit with prescriber/PCP: 2/12/2019 Mikki Jiménez MD OR same dept: 6/28/2019 Thao Robledo MD OR same specialty: 6/28/2019 Thao Robledo MD  Last physical: Visit date not found Last MTM visit: Visit date not found   Next visit within 3 mo: Visit date not found  Next physical within 3 mo: Visit date not found  Prescriber OR PCP: Mikki Jiménez MD  Last diagnosis associated with med order: 1. Seasonal allergies  - loratadine (CLARITIN) 10 mg tablet [Pharmacy Med Name: LORATADINE 10 MG TABLET 10 TAB]; TAKE 1 PILL BY MOUTH DAILY AS NEEDED FOR ALLERGY  Dispense: 30 tablet; Refill: 9    If protocol passes may refill for 12 months if within 3 months of last provider visit (or a total of 15 months).

## 2021-05-30 NOTE — PROGRESS NOTES
[facility] Progress Note    Patient: Chester Gordon  MRN: [id number]  Date of Service: 7/17/2019        Reason for visit      1. 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  A very pleasant 79 y.o. gentleman with bilateral inguinal adenopathy as well as pelvic adenopathy in the right testicle mass.  The biopsy is positive for Diffuse large b cell lymphoma which is Activated B cell type.  This generally has a poor prognosis compared to germinal B-cell type of diffuse large B-cell lymphoma.  2.  Significant skin rash which could be due to bedbug bites.  3.  Continued smoking.  4.  Significant language barrier.  5.  Testicular involvement with the lymphoma as well.  This is going to require high-dose methotrexate therapy.    Plan     1.  The patient requires PET scan for complete staging.  2.  Port-A-Cath placement.  3.  Discussed with the patient that the activated B-cell histology requires somewhat more aggressive therapy.  We will offer him treatment with R-EPOCH.  This is dose adjusted treatment.  4.  Discussed with the patient that this is a very aggressive therapy.  He needs to have good understanding and full compliance.  This treatment will cause him significant amount of side effects.  He needs to be able to accept it.  The patient was not sure.  He is going to talk among his family.  5.  Follow-up with me after the above testing is done.  We did give him printed information about the drug but they are in English.    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 old male with a history of newly diagnosed DLBCL lymphoma located in the groin measuring 80mm  in size presenting with adenopathy and associated with some swelling in groin and legs in June 2018. He presented to Dr. Mayer's clinic with groin swelling. Evaluation revealed that he had bilateral inguinal adenopathy and right testicular mass. This was followed by an ultrasound and a CT scan. The CT scan showed bulky adenopathy in bilateral inguinal region, retroperitoneum and iliac lymph nodes. The largest lymph node measuring almost 5 cm in size. There is also mass on the right testis. It was felt that this could be testicular cancer. Therefore he was referred to urology. He has not seen urology and was referred to us. The patient describes having testicular swelling for only few weeks. The inguinal lymph node have been swollen for about a month and a half. He is also complaining of some itching. Denies any significant fever chills or weight loss. His main symptom is itching on his chest which is mostly likely some other skin condition.     We did a ultrasound-guided biopsy of 1 of his inguinal lymph node.  He is here to discuss the results of the same.  Clinically he is more or less the same as last visit.  Denies any new problem.  Somewhat anxious.    Past Medical History     Past Medical History:   Diagnosis Date     Cognitive disorder      Gout      Hyperlipidemia      Hypertension      Lymphoma (H)            Review of Systems   Constitutional  Constitutional (WDL): Exceptions to WDL  Fatigue: Fatigue not relieved by rest - Limiting instrumental ADL  Weight Gain: 5 - <10% from baseline(up 6 lbs)  Neurosensory  Neurosensory (WDL): All neurosensory elements are within defined limits  Cardiovascular  Cardiovascular (WDL): Exceptions to WDL  Edema: Yes  Edema Limbs: 5 - 10% inter-limb discrepancy in volume or circumference at point of greatest visible difference, swelling or obscuration of anatomic architecture on close inspection(rt leg from biopsy)  Pulmonary  Respiratory (WDL): Exceptions to WDL  Cough:  Mild symptoms, nonprescription intervention indicated(white phlegm)  Gastrointestinal  Gastrointestinal (WDL): All gastrointestinal elements are within defined limits  Genitourinary  Genitourinary (WDL): All genitourinary elements are within defined limits  Integumentary  Integumentary (WDL): Exceptions to WDL  Pruritus: Intense or widespread, intermittent, skin changes from scratching (e.g., edema, papulation, excoriations, lichenification, oozing/crusts), oral intervention indicated, limiting instrumental ADL  Patient Coping     Accompanied by  Accompanied by: Family Member(and )    ECOG performance status and Distress Assessment      ECOG Performance:    ECOG Performance Status: 1    Distress Assessment  Distress Assessment Score: No distress:     Pain Status  Currently in Pain: No/denies        Vital Signs     Vitals:    07/17/19 1338   BP: 115/65   Pulse: 88   Temp: 98.2  F (36.8  C)   SpO2: 98%       Physical Exam     GENERAL: No acute distress. Cooperative in conversation.   HEENT: Pupils are equal, round and reactive. Oral mucosa is clean and intact. No ulcerations or mucositis noted. No bleeding noted.  RESP:Chest symmetric lungs are clear bilaterally per auscultation. Regular respiratory rate. No wheezes or rhonchi.  CV: Normal S1 S2 Regular, rate and rhythm. No murmurs.  ABD: Nondistended, soft, nontender. Positive bowel sounds. No organomegaly.   EXTREMITIES: right lower extremity edema.   NEURO: Non- focal. Alert and oriented x3.  Cranial nerves appear intact.  PSYCH: Within normal limits. No depression or anxiety.  SKIN: Warm dry intact.    LYMPH NODES: Bilateral cervical, supraclavicular, axillary lymph node examination was done. B/l Groin and inguinal palpable adenopathy.      Lab Results     Results for orders placed or performed during the hospital encounter of 07/12/19   Medical Cytology   Result Value Ref Range    Case Report       Medical Cytology                                   Case: [id number]                                   Authorizing Provider:  Scott Rodriguez MD       Collected:           07/12/2019 1345              Ordering Location:     [facility]        Received:            07/12/2019 1413                                     Ultrasound                                                                   Pathologist:           Pito Case MD                                                        Specimen:    Lymph Node, Rt inguinal lymph node                                                         Final Diagnosis       RIGHT INGUINAL LYMPH NODE, NEEDLE CORE BIOPSIES:     -   DIFFUSE LARGE B-CELL LYMPHOMA, ACTIVATED B-CELL SUBTYPE     -   PENDING FISH STUDIES (DUAL HIT PANEL, SEE [facility] REPORT)    MCSS    Comment       The clinical history has been reviewed.  Concurrent flow cytometry () demonstrates a lambda light chain restricted monoclonal B-cell population. Pending FISH studies, the results of which will be incorporated into the final report.     Cytology and histology demonstrate a markedly atypical lymphoid infiltrate characterized by medium to large mature cells. The tumor cells are positive for CD20 and MUM-1. There is weak partial staining for BCL-6 and BCL-2. The proliferation rate by Ki-67 is markedly elevated at greater than 90%.     Cyclin D1 is negative arguing strong against mantle cell lymphoma. CD56 is negative for neuroendocrine differentiation in a subset of lymphomas. CD21 highlights no significant follicular dendritic cell meshworks. CD10 is negative arguing strongly against lymphomas of germinal cell origin. EBV in situ hybridization studies are negative. CD3 highlights background T-cells. An PARVEZ keratin stain is negative for carcinoma.    All controls stain  appropriately.    Microscopic Description       Microscopic examination performed, substantiating the above diagnosis.    Clinical Information Testicular masses     Specimen  Description       Biopsy was performed under Ultrasound guidance by Dr. Arvin Estrada with 7 pass(es) from which:                 4 Air-dried smear(s)  1 RPMI  1 cell/tissue block slides are prepared.     Assisted by:  AYAZ Omer    Specimen Processing      Initial Cytologic Evaluation       Site #1, Episode #1, # Passes 7 : (3 aspirates for flow) Atypical lymphoid infiltrate. BHS    Special Stains       Note - Ki67 Immunoperoxidase Stain:  This stain was done using the following criteria:    Specimen fixative: Formalin-fixed paraffin-embedded sections    Detection system: Biotin-free multimer-based technology detection system    Clone:  30-9 (PixSense)    Scoring method: % of cells stained     GUDELIA  FDA Disclaimer:    The In-Situ Hybridization test/s was/were developed and the performance characteristics determined by [employer]. It has not been cleared or approved by the [place] Food and Drug Administration.    The Food and Drug Administration has determined that clearance or approval is not necessary, because this test is used for clinical purposes. This test should not be regarded as investigational or research.    [employer] is certified for the performance of high-complexity clinical testing under the Clinical Laboratory Improvement Amendments of 1988 (CLIA), and in keeping with the certification requirements, [employer] has verified this test's  accuracy and precision or validity of the method. Additional information is available upon request.    Charges       CPT: 30882, 91371, 69888, 35335, 12643, 57299 ×9   ICD-10: C85.10    cc: Arvin Estrada MD    General Path Interpretation Positive for malignant cells (!) Negative for malignant cells, Non-Diagnostic   Flow cytometry   Result Value Ref Range    Case Report       Flow Cytometry Report                             Case: [id number]                                    Authorizing Provider:  Scott Rodriguez MD       Collected:            07/12/2019 1345              Ordering Location:     [facility]        Received:            07/12/2019 1530                                     Ultrasound                                                                   Pathologist:           Pito Case MD                                                        Specimen:    Lymph Node(s), Inguinal, Right, FNA - rt inguinal LN                                       Diagnosis       RIGHT INGUINAL LYMPH NODE, FINE NEEDLE ASPIRATION WITH FLOW CYTOMETRY   IMMUNOPHENOTYPIC CHARACTERIZATION:    - LAMBDA LIGHT CHAIN-RESTRICTED MONOCLONAL B-CELL POPULATION    MCSS    Comment       The clinical history has been reviewed. Flow cytometry identifies a lambda light chain-restricted monoclonal B-cell population that also expresses CD19, CD20 and CD22. CD5 and CD10 are negative. Preliminary impression of diffuse large B-cell lymphoma, activated B-cell subtype emailed to Dr. Scott Rodriguez on 07/15/19. Pending final review of immunoarchitecture. Pending FISH studies.    Phenotypic Data          Low Probability: FNA, Tissue, Body Fluid Phenotyping Report      Phenotyping Description of Gated Cells    Source: FNA - right inguinal lymph node  Case Reference/Related Case: [facility] [id number]      Antibody  %   Dual Labeling         %    B-Related    CD19  54      2  CD20  56      2  CD22  54   CW97-qgh.  2  CD10  2   HS01-ufj.   51       CD22-10   2       CD56-38  NA       Tequila./Peña.*  0.04:1       DB74-bxd.  NA       PI54-ttu.  NA         Antibody %   Dual Labeling        %  T-Related    CD7   37   CD3-4   20  CD5   43   CD3-8   18  CD3   42   CD2-7   NA   CD2   NA   CD4-8   NA  CD4  25   CD4/CD8*  1.1:1  CD8  20  CD56  5        Antibody %   Dual Labeling       %  Myeloid/Monocytic/Misc.    CD45   100  CD14   NA  CD38  43    HLA-DR 39      Cell Count is approximately: 0.13 x 10*3/uL  Viability is: 80%    *=ratio  NA=antibody not  run  carolyn.=kappa  alcaraz.-lambda    Charges CPT: 70358 (15 markers)   ICD-10: C85.10     Result Flag Malignant (!) Normal         Imaging Results     Us Biopsy Fine Needle Aspiration Lymph Node    Result Date: 7/12/2019  EXAM: 1. PERCUTANEOUS BIOPSY RIGHT INGUINAL LYMPHADENOPATHY WITH FNA RIGHT INGUINAL LYMPHADENOPATHY 2. ULTRASOUND GUIDANCE LOCATION: [facility] DATE/TIME: 7/12/2019 2:29 PM INDICATION: Testicular masses with inguinal adenopathy. PROCEDURE: Informed consent obtained. Time out performed. The site was prepped and draped in sterile fashion. 10 mL of 1 percent lidocaine was infused into the local soft tissues. Using standard technique and under direct ultrasound guidance, a 20-gauge biopsy needle was used to make 4 core biopsies. Additional fine-needle aspirations performed for flow cytometry per pathology request. Tissue was submitted to Pathology. The patient tolerated the procedure well. No complications. RADIOLOGIC SUPERVISION AND INTERPRETATION: ULTRASOUND GUIDANCE: Images demonstrate the biopsy needle in satisfactory position.     CONCLUSION: Status post US-guided biopsy and fine-needle aspiration right inguinal lymphadenopathy. Reference CPT Code: 54446, 52131,    Us Lymph Node Biopsy    Result Date: 7/12/2019  EXAM: 1. PERCUTANEOUS BIOPSY RIGHT INGUINAL LYMPHADENOPATHY WITH FNA RIGHT INGUINAL LYMPHADENOPATHY 2. ULTRASOUND GUIDANCE LOCATION: [facility] DATE/TIME: 7/12/2019 2:29 PM INDICATION: Testicular masses with inguinal adenopathy. PROCEDURE: Informed consent obtained. Time out performed. The site was prepped and draped in sterile fashion. 10 mL of 1 percent lidocaine was infused into the local soft tissues. Using standard technique and under direct ultrasound guidance, a 20-gauge biopsy needle was used to make 4 core biopsies. Additional fine-needle aspirations performed for flow cytometry per pathology request. Tissue was submitted to Pathology. The patient tolerated the  procedure well. No complications. RADIOLOGIC SUPERVISION AND INTERPRETATION: ULTRASOUND GUIDANCE: Images demonstrate the biopsy needle in satisfactory position.     CONCLUSION: Status post US-guided biopsy and fine-needle aspiration right inguinal lymphadenopathy. Reference CPT Code: 87002, 41692,        Scott Rodriguez MD

## 2021-05-30 NOTE — PROGRESS NOTES
Hospital admission has been set up for 7/29/19, after visits in our clinic on that day.    Chester Gordon  1940  [id number]    Diagnosis: Diffuse Large B-Cell Lymphoma  Dr Rodriguez is the following provider so no hospitalist is needed.  He will be getting R-EPOCH which will be about a 5 day stay.    Patient has an appointment for labs and to see our Nurse Practitioner on 7/29/19 at 0830.  Once labs are reviewed, orders will be signed and consent will be signed that day.  Patient will get to P2 around 0930.    Mari Gamez RN

## 2021-05-30 NOTE — TELEPHONE ENCOUNTER
Call from daughter with  on the line       CC:  Itchy rash x 7-10days         Rash x 7-10 days     Belly, back, legs and arms    Very itchy     Yes some swelling in the joints   Yes some blisters   No skin peeling     No sores in mouth   No fever       A/P:   > OK for appt today in the office         Rell Verdin RN   Triage and Medication Refills          Reason for Disposition    Rash looks like large or small blisters (i.e., fluid filled bubbles or sacs on the skin)    Protocols used: RASH OR REDNESS - WIDESPREAD-A-OH

## 2021-05-30 NOTE — PROGRESS NOTES
Chief Complaint   Patient presents with     Rash         HPI:   Chester Gordon is a 79 y.o. male with  and daughter c/o itchy skin for the last 5 days all over body.  Started with some small red bumps after scratching.  No fever.  No trouble like this in the past.  No change in diet.  No new soaps, detergents.      ROS:  A 10 point comprehensive review of systems was negative except as noted.     Medications:  Current Outpatient Medications on File Prior to Visit   Medication Sig Dispense Refill     acetaminophen (TYLENOL) 500 MG tablet TAKE 1 TABLET (500 MG TOTAL) BY MOUTH EVERY 6 (SIX) HOURS AS NEEDED FOR PAIN. 90 tablet 0     allopurinol (ZYLOPRIM) 100 MG tablet Take 2 tablets (200 mg total) by mouth daily. 180 tablet 4     indomethacin (INDOCIN) 50 MG capsule TAKE 1 CAPSULE (50 MG TOTAL) BY MOUTH 2 (TWO) TIMES A DAY FOR GOUT FLARES 30 capsule 10     lidocaine (XYLOCAINE) 5 % ointment Apply to painful joint three times a day as needed for pain 35.44 g 2     loratadine (CLARITIN) 10 mg tablet Take 1 tablet by mouth daily.  3     losartan-hydrochlorothiazide (HYZAAR) 50-12.5 mg per tablet TAKE 1 PILL BY MOUTH DAILY FOR HIGH BLOOD PRESSURE. 30 tablet 10     polyvinyl alcohol (LIQUIFILM TEARS) 1.4 % ophthalmic solution Administer 2 drops to both eyes as needed.       TAB-A-MARK per tablet TAKE 1 TABLET BY MOUTH DAILY. 100 tablet 3     VITAMIN D3 2,000 unit capsule TAKE 1 PILL (2,000 UNITS) BY MOUTH EVERY DAY 90 capsule 3     No current facility-administered medications on file prior to visit.          Social History:  Social History     Tobacco Use     Smoking status: Current Every Day Smoker     Packs/day: 0.25     Years: 70.00     Pack years: 17.50     Types: Cigarettes, Pipe     Smokeless tobacco: Current User     Tobacco comment: chews betel nut w/o tobacco.    Substance Use Topics     Alcohol use: No         Physical Exam:   Vitals:    06/28/19 1341   BP: 100/60   Pulse: 73   Resp: 16   Temp: 97.9  F  "(36.6  C)   TempSrc: Oral   SpO2: 97%   Weight: 153 lb (69.4 kg)   Height: 5' 3.58\" (1.615 m)       GENERAL:   Alert. Oriented.  EYES: Clear  HENT:  Ears: R TM pearly gray. Normal landmarks. L TM pearly gray.  Normal landmarks  Nose: Clear.  Sinuses: Nontender.  Oropharynx:  No erythema. No exudate.  NECK: Supple. No adenopathy.  LUNGS: Clear to ascultation.  No crackles.  No wheezing  HEART: RRR  SKIN:  Few scattered scabbed papules on abdomen        Assessment/Plan:    1. Dry skin  white petrolatum (WHITE PETROLEUM JELLY) ointment      Use soap only underarms and on genital area.  Tepid water for bathing.  vaseline daily  Antihistamine as needed for itching.              Thao Robledo MD      6/28/2019    The following portions of the patient's history were reviewed and updated as appropriate: allergies, current medications, past family history, past medical history, past social history, past surgical history and problem list.      "

## 2021-05-30 NOTE — SEDATION DOCUMENTATION
No longer receiving consistent data or waveform from [facility] and BP decreased to 90 Systolic Dr Qureshi aware all other vss stable fluid bolus 250 ml given.

## 2021-05-30 NOTE — CONSULTS
Consults   [facility] Hematology and Oncology Consult Note    Patient: Chester Gordon  MRN: [id number]  Date of Service: 07/02/2019        Reason for Visit     1. Testicular tumor  US Lymph Node Biopsy    Comprehensive Metabolic Panel    HM1(CBC and Differential)    Kappa/Lambda Quantitative Free Light Chains and Ratio, Serum(FLCS)    Immunoglobulins IgG, IgA, IgM    Lactate Dehydrogenase (LDH)    AFP-Tumor Marker    Beta-hCG Tumor Marker (HCGTM)    AFP-Tumor Marker    Comprehensive Metabolic Panel    HM1(CBC and Differential)    Immunoglobulins IgG, IgA, IgM    Lactate Dehydrogenase (LDH)    HM1 (CBC with Diff)    NM PET CT Skull to Mid Thigh    Gates/Lambda Quantitative Free Light Cahins and Ratio,Serum - Misc. Lab Test    ARUP Misc Test    CANCELED: Beta-hCG Tumor Marker (HCGTM)    CANCELED: Kappa/Lambda Quantitative Free Light Chains and Ratio, Serum(FLCS)         Assessment     1.  A very pleasant 79-year-old gentleman with bilateral inguinal adenopathy as well as pelvic adenopathy in the right testicle mass.  The overall critical picture is of a lymphoma rather than a testicular cancer.  2.  Significant skin rash which could be due to bedbug bites.  3.  Significant language barrier.  4.  Significant language barrier.    Plan     1.  Get a baseline labs including CBC immunoglobin profile etc.  We will also check testicular tumor markers which are LDH AFP and beta-hCG.  2.  Image-guided biopsy of 1 of the inguinal lymph node  3.  If the image guided biopsy shows lymphoma then we are going to order him a PET scan and then he will also need a bone marrow biopsy.  If he is confirmed to have a testicular lymphoma that typically carries a little bit more aggressive clinical behavior.  Those patients typically also require CNS evaluation and prophylaxis with intrathecal methotrexate in addition to standard chemotherapy for testicular lymphoma.  If it is a different cancer and then he may still need PET scan and depending  upon what kind of cancer it is a systemic treatment for it.  4.  Discussed with the patient that he may need the treatment for bedbugs or whatever the skin condition might be.  5.  Follow-up with me after the above tests are done.    Staging History     Cancer Staging  No matching staging information was found for the patient.    History     Patient is a very pleasant 79-year-old gentleman from [place] origin.  He has been referred to me by Dr. Mayer for evaluation of possible testicular cancer.  He presented to Dr. Mayer's clinic with groin swelling.  Evaluation revealed that he had bilateral inguinal adenopathy and right testicular mass.  This was followed by an ultrasound and a CT scan.  The CT scan showed bulky adenopathy in bilateral inguinal region, retroperitoneum and iliac lymph nodes.  The largest lymph node measuring almost 5 cm in size.  There is also mass on the right testis.  It was felt that this could be testicular cancer.  Therefore he was referred to urology.  He has not seen urology and was referred to us.  The patient describes having testicular swelling for only few weeks.  The inguinal lymph node have been swollen for about a month and a half.  He is also complaining of some itching.  Denies any significant fever chills or weight loss.  His main symptom is itching on his chest which is mostly likely some other skin condition.    Past Medical History     Past Medical History:   Diagnosis Date     Cognitive disorder      Gout      Hyperlipidemia      Hypertension        Past Social History     Social History     Socioeconomic History     Marital status:      Spouse name: Not on file     Number of children: 6     Years of education: Not on file     Highest education level: Not on file   Occupational History     Not on file   Social Needs     Financial resource strain: Not on file     Food insecurity:     Worry: Not on file     Inability: Not on file     Transportation needs:      Medical: Not on file     Non-medical: Not on file   Tobacco Use     Smoking status: Current Every Day Smoker     Packs/day: 0.25     Years: 70.00     Pack years: 17.50     Types: Pipe     Smokeless tobacco: Current User   Substance and Sexual Activity     Alcohol use: Yes     Comment: social     Drug use: No     Sexual activity: Never   Lifestyle     Physical activity:     Days per week: Not on file     Minutes per session: Not on file     Stress: Not on file   Relationships     Social connections:     Talks on phone: Not on file     Gets together: Not on file     Attends [employer] service: Not on file     Active member of club or organization: Not on file     Attends meetings of clubs or organizations: Not on file     Relationship status: Not on file     Intimate partner violence:     Fear of current or ex partner: Not on file     Emotionally abused: Not on file     Physically abused: Not on file     Forced sexual activity: Not on file   Other Topics Concern     Not on file   Social History Narrative    Political Refugee    [place] refugee. Born in [facility]. [place]. [place] in [place] in the summer of [place] initially to [place] then moved [place] in [place] 2010.  He currently lives his wife and daughter.His other children are  and live close to him in [facility].       Family History     Family History   Problem Relation Age of Onset     No Medical Problems Mother      No Medical Problems Father        Medication List     Prior to Admission medications    Medication Sig Start Date End Date Taking? Authorizing Provider   acetaminophen (TYLENOL) 500 MG tablet TAKE 1 TABLET (500 MG TOTAL) BY MOUTH EVERY 6 (SIX) HOURS AS NEEDED FOR PAIN. 6/20/19  Yes Herbie Bergman MD   allopurinol (ZYLOPRIM) 100 MG tablet Take 2 tablets (200 mg total) by mouth daily. 8/9/18  Yes Mikki Jiménez MD   indomethacin (INDOCIN) 50 MG capsule TAKE 1 CAPSULE (50 MG TOTAL) BY MOUTH 2 (TWO) TIMES A DAY FOR GOUT FLARES 2/26/19  Yes Mikki Jiménez  MD   lidocaine (XYLOCAINE) 5 % ointment Apply to painful joint three times a day as needed for pain 8/31/17  Yes Sfoya Smith MD   loratadine (CLARITIN) 10 mg tablet Take 1 tablet by mouth daily as needed.        5/21/19  Yes PROVIDER, HISTORICAL   losartan-hydrochlorothiazide (HYZAAR) 50-12.5 mg per tablet TAKE 1 PILL BY MOUTH DAILY FOR HIGH BLOOD PRESSURE. 3/27/19  Yes Mikki Jiménez MD   polyvinyl alcohol (LIQUIFILM TEARS) 1.4 % ophthalmic solution Administer 2 drops to both eyes as needed.   Yes PROVIDER, HISTORICAL   TAB-A-MARK per tablet TAKE 1 TABLET BY MOUTH DAILY. 12/6/18  Yes Mikki Jiménez MD   VITAMIN D3 2,000 unit capsule TAKE 1 PILL (2,000 UNITS) BY MOUTH EVERY DAY 8/28/18  Yes Misha Coleman MD   white petrolatum (WHITE PETROLEUM JELLY) ointment Apply topically as needed for dry skin. 6/28/19 7/2/19  Thao Robledo MD       Allergies     Allergies   Allergen Reactions     No Known Drug Allergies        Review of Systems   A comprehensive review of 14 systems was done. Pertinent findings noted here and in history of present illness. All the rest negative.     General  General (WDL): Exceptions to WDL  Fatigue: Yes - Recent (Less than 3 months)  Weight Loss: Yes - Recent (Greater than 3 months)(10 lbs in a year)  ENT  ENT (WDL): Exceptions to WDL  Hearing loss: Yes - Recent (Less than 3 months)  Tinnitus: Yes - Recent (Less than 3 months)  Dentures: Yes - Recent (Less than 3 months)(upper and lower)  Respiratory  Respiratory (WDL): All respiratory elements are within defined limits  Cardiovascular  Cardiovascular (WDL): All cardiovascular elements are within defined limits  Endocrine  Endocrine (WDL): All endocrine elements are within defined limits  Gastrointestinal  Gastrointestinal (WDL): All gastrointestinal elements are within defined limits  Musculoskeletal  Musculoskeletal (WDL): Exceptions to WDL  Range of Motion Limitation: Yes - Recent (Less than 3  "months)  Neurological  Neurological (WDL): All neurological elements are within defined limits  Psychological/Emotional  Psychological/Emotional (WDL): All psychological/emotional elements are within defined limits  Hematological/Lymphatic  Hematological/Lymphatic (WDL): All hematological/lymphatic elements are within defined limits  Dermatological  Dermatologic (WDL): Exceptions to WDL  Rash : Yes - Recent (Less than 3 months)(chest)  Genitourinary/Reproductive  Genitourinary/Reproductive (WDL): All genitourinary/reproductive elements are within defined limits  Reproductive (Females only)     Pain  Currently in Pain: No/denies    Physical Exam     Recent Vitals 7/2/2019   Height 5' 3.5\"   Weight 149 lbs 14 oz   BSA (m2) 1.75 m2   /66   Pulse 92   Temp 98.4   Temp src 1   SpO2 98   Some recent data might be hidden       Constitutional: Oriented to person, place, and time and appears well-developed.   HEENT:  Normocephalic and atraumatic.  Eyes: Conjunctivae and EOM are normal. Pupils are equal, round, and reactive to light. No discharge.  No scleral icterus. Nose normal. Mouth/Throat: Oropharynx is clear and moist. No oropharyngeal exudate.  He is missing all of his teeth.  NECK: Normal range of motion. Neck supple. No JVD present. No tracheal deviation present. No thyromegaly present. Small lymph node on the left posterior cervical chain measuring approximately 1 cm in size.  Soft and mobile.  CARDIOVASCULAR: Normal rate, regular rhythm and intact distal pulses.  Exam reveals no gallop and no friction rub.  Systolic murmur present.  PULMONARY: Effort normal and breath sounds normal. No respiratory distress.No Wheezing or rales.  ABDOMEN: Soft. Bowel sounds are normal. No distension and no mass.  There is no tenderness. There is no rebound and no guarding. No HSM.  He has bilateral inguinal swelling due to adenopathy.  MUSCULOSKELETAL: Normal range of motion. No edema and no tenderness. Mild kyphosis, no " tenderness.  LYMPH NODES: Has Palpable adenopathy in the left posterior cervical chain in the form of her one small 1 cm lymph node which is soft and mobile.  Then he has bulky adenopathy in his bilateral groin area.  Left more than the right.  NEUROLOGICAL: Alert and oriented to person, place, and time. No cranial nerve deficit.  Normal muscle tone. Coordination normal.   GENITOURINARY: Normal uncircumcised penis.  Right testicle has a firm mass in the epididymis.  Left testicle normal.  SKIN: Skin is warm and dry.  He appears to have some type of rash on his chest which is looking like perhaps bug bites from bedbugs.  They have a peripheral erythema with central punctum.  He also has tattoos on his buttock and upper thighs.  EXTREMITIES: No cyanosis, no clubbing, no edema. No Deformity.  PSYCHIATRIC: Normal mood, affect and behavior.      Lab Results     Recent Results (from the past 48 hour(s))   Comprehensive Metabolic Panel   Result Value Ref Range    Sodium 139 136 - 145 mmol/L    Potassium 4.2 3.5 - 5.0 mmol/L    Chloride 104 98 - 107 mmol/L    CO2 25 22 - 31 mmol/L    Anion Gap, Calculation 10 5 - 18 mmol/L    Glucose 127 (H) 70 - 125 mg/dL    BUN 32 (H) 8 - 28 mg/dL    Creatinine 1.36 (H) 0.70 - 1.30 mg/dL    GFR MDRD Af Amer >60 >60 mL/min/1.73m2    GFR MDRD Non Af Amer 50 (L) >60 mL/min/1.73m2    Bilirubin, Total 0.4 0.0 - 1.0 mg/dL    Calcium 10.4 8.5 - 10.5 mg/dL    Protein, Total 8.2 (H) 6.0 - 8.0 g/dL    Albumin 3.1 (L) 3.5 - 5.0 g/dL    Alkaline Phosphatase 80 45 - 120 U/L    AST 25 0 - 40 U/L    ALT 22 0 - 45 U/L   Immunoglobulins IgG, IgA, IgM   Result Value Ref Range    Immunoglobulin G 2,075 (H) 700-1,700 mg/dL    Immunoglobulin M 122 60 - 280 mg/dL    Immunoglobulin A 367 65 - 400 mg/dL   Lactate Dehydrogenase (LDH)   Result Value Ref Range    LD (LDH) 368 (H) 125 - 220 U/L   HM1 (CBC with Diff)   Result Value Ref Range    WBC 5.6 4.0 - 11.0 thou/uL    RBC 3.69 (L) 4.40 - 6.20 mill/uL     "Hemoglobin 12.2 (L) 14.0 - 18.0 g/dL    Hematocrit 36.2 (L) 40.0 - 54.0 %    MCV 98 80 - 100 fL    MCH 33.1 27.0 - 34.0 pg    MCHC 33.7 32.0 - 36.0 g/dL    RDW 13.8 11.0 - 14.5 %    Platelets 313 140 - 440 thou/uL    MPV 9.4 8.5 - 12.5 fL    Neutrophils % 43 (L) 50 - 70 %    Lymphocytes % 30 20 - 40 %    Monocytes % 18 (H) 2 - 10 %    Eosinophils % 9 (H) 0 - 6 %    Basophils % 1 0 - 2 %    Neutrophils Absolute 2.4 2.0 - 7.7 thou/uL    Lymphocytes Absolute 1.7 0.8 - 4.4 thou/uL    Monocytes Absolute 1.0 (H) 0.0 - 0.9 thou/uL    Eosinophils Absolute 0.5 (H) 0.0 - 0.4 thou/uL    Basophils Absolute 0.0 0.0 - 0.2 thou/uL           Imaging Results     Ct Pelvis Without Oral With Iv Contrast    Result Date: 6/13/2019  EXAM: CT PELVIS WO ORAL W IV CONTRAST LOCATION: [facility] DATE/TIME: 6/13/2019 4:02 PM INDICATION: 5\" x 2\" swelling right inguinal.  Right scrotal mass. COMPARISON: Ultrasound from 6/13/2019 TECHNIQUE: Helical enhanced thin-section CT scan of the pelvis was performed during injection of IV contrast. Multiplanar reformats were obtained. Dose reduction techniques were used. CONTRAST: Iohexol (Omni) 100 mL FINDINGS: PELVIS: There is bulky bilateral inguinal lymphadenopathy and adenopathy along both iliac chains, right worse than left consistent with malignant adenopathy. The largest individual right inguinal lymph node measures 4.3 x 2 cm while right side external iliac nodes measure up to 4 x 4 cm. There is prominent scrotal wall thickening and edema. Nonspecific hyperemic mass seen on the inferior aspect of right hemiscrotum measures approximately 3.5 x 2 cm consistent with the extratesticular mass seen on ultrasound in the same location. MUSCULOSKELETAL: No suspicious bony lesion.     CONCLUSION: 1.  Bulky inguinal and pelvic lymphadenopathy, right worse than left, consistent with malignant adenopathy. Likely this relates to a right testicular primary malignancy. 2.  As indicated, the inguinal " adenopathy could be sampled under sonographic guidance     Us Scrotum And Testicles W Duplex Ltd    Result Date: 6/13/2019  EXAM: US SCROTUM AND TESTICLES WITH DUPLEX LIMITED LOCATION: [facility] DATE/TIME: 6/13/2019 3:45 PM INDICATION: 2 cm hard mass of right testis/epididymus COMPARISON: None. TECHNIQUE: Ultrasound of scrotum with duplex utilizing 2D gray-scale imaging, Doppler interrogation with color-flow and spectral waveform analysis. FINDINGS: RIGHT: Right testicle measures 4.8 x 2.4 x 2.8 cm. There is a heterogeneous solid structure with vascular flow located above the right testicle. It measures 2.1 x 2.5 x 3.2 cm. There are also multiple right intratesticular hypoechoic solid nodular lesions including a dominant lesion at the inferior aspect measuring 1.7 x 1.2 x 2.2 cm. Another dominant lesion in the upper testis measures 1.5 x 1.1 x 1.4 cm and a third lesion measures 1.4 x 1.3 x 1.3 cm. Normal epididymis. No hydrocele. LEFT: Left testicle measures 4.1 x 2.6 x 2.3 cm. Normal testicle with no masses. Normal arterial duplex and normal color flow. Incidental 5 mm epididymal head cyst. No hydrocele.     CONCLUSION: 1.  Multiple suspicious solid right intratesticular lesions as well as a solid extratesticular lesion located superior to the right testis. Differential considerations include primary testicular tumor with a right inguinal metastatic lesion versus testicular/right inguinal metastases from unknown primary. NOTE: ABNORMAL REPORT THE DICTATION ABOVE DESCRIBES AN ABNORMALITY FOR WHICH FOLLOW-UP IS NEEDED.       Total time spent was 60 minutes, more than half of it was in face-to-face counseling regarding disease state, review of available images, laboratory values, pathological reports, treatment, options, their side effects and management.    Scott Rodriguez MD

## 2021-05-30 NOTE — PROGRESS NOTES
At the request of Dr. Rodriguez, I changed the consult appoitnment for this patient to 7/2/19.  I called via the language line to inform of change.  They will arrive at 8:15 AM to complete paperwork.

## 2021-05-30 NOTE — PROGRESS NOTES
Patient is here for labs and provider for Diffuse large B-cell lymphoma of lymph nodes of multiple regions.

## 2021-05-30 NOTE — PROGRESS NOTES
Patient was seen by me to review labs.  I did review labs.  He will be admitted today to start his chemotherapy regimen.  He will get dose adjusted R-EPOCH as an inpatient.  Get high-dose methotrexate after cycles 2, 4, 6.  MUGA scan is normal.  His port is in.  His PET scan shows extensive disease.  Patient did sign a consent and I did review all side effects and expected plan with patient.  I did review images with patient and family.  Dr. Rodriguez will do his H&P.  Please see his note for details.   was present for this visit.

## 2021-05-31 VITALS — HEIGHT: 64 IN | WEIGHT: 154.3 LBS | BODY MASS INDEX: 26.34 KG/M2

## 2021-05-31 VITALS — WEIGHT: 158 LBS | BODY MASS INDEX: 26.98 KG/M2 | HEIGHT: 64 IN

## 2021-05-31 VITALS — BODY MASS INDEX: 26.91 KG/M2 | WEIGHT: 154.31 LBS

## 2021-05-31 VITALS — WEIGHT: 156.8 LBS | HEIGHT: 63 IN | BODY MASS INDEX: 27.78 KG/M2

## 2021-05-31 VITALS — HEIGHT: 63 IN | BODY MASS INDEX: 27.73 KG/M2 | WEIGHT: 156.5 LBS

## 2021-05-31 NOTE — TELEPHONE ENCOUNTER
Refill Approved    Rx renewed per Medication Renewal Policy. Medication was last renewed on 8/9/2018 with 4 refills.  Last office visit: 8/13/2019 with PCP Dr SULMA Simmons, [facility] Triage/Med Refill 8/14/2019     Requested Prescriptions   Pending Prescriptions Disp Refills     allopurinol (ZYLOPRIM) 100 MG tablet [Pharmacy Med Name: ALLOPURINOL 100 MG TABLET 100 TAB] 180 tablet 4     Sig: TAKE 2 TABLETS (200 MG TOTAL) BY MOUTH DAILY FOR GOUT       Allopurinol/Febuxostat Refill Protocol  Passed - 8/14/2019 10:05 AM        Passed - LFT or AST or ALT in last 12 months     Albumin   Date Value Ref Range Status   07/29/2019 2.8 (L) 3.5 - 5.0 g/dL Final     Bilirubin, Total   Date Value Ref Range Status   07/29/2019 0.3 0.0 - 1.0 mg/dL Final     Alkaline Phosphatase   Date Value Ref Range Status   07/29/2019 74 45 - 120 U/L Final     AST   Date Value Ref Range Status   07/29/2019 31 0 - 40 U/L Final     ALT   Date Value Ref Range Status   07/29/2019 40 0 - 45 U/L Final     Protein, Total   Date Value Ref Range Status   07/29/2019 7.3 6.0 - 8.0 g/dL Final                Passed - Visit with PCP or prescribing provider visit in past 12 months     Last office visit with prescriber/PCP: 8/13/2019 Mikki Jiménez MD OR same dept: 8/13/2019 Mikki Jiménez MD OR same specialty: 8/13/2019 Mikki Jiménez MD  Last physical: Visit date not found Last MTM visit: Visit date not found   Next visit within 3 mo: Visit date not found  Next physical within 3 mo: Visit date not found  Prescriber OR PCP: Mikki Jiménez MD  Last diagnosis associated with med order: 1. Gout  - allopurinol (ZYLOPRIM) 100 MG tablet [Pharmacy Med Name: ALLOPURINOL 100 MG TABLET 100 TAB]; TAKE 2 TABLETS (200 MG TOTAL) BY MOUTH DAILY FOR GOUT  Dispense: 180 tablet; Refill: 4    If protocol passes may refill for 12 months if within 3 months of last provider visit (or a total of 15 months).

## 2021-05-31 NOTE — PROGRESS NOTES
Pt arrived ambulatory to clinic for Cycle # 1 Day # 8 of his chemotherapy regimen.  Port was accessed using aseptic technique without difficulties with excellent blood return.  Sheryl reviewed labs which were stable, no additional orders today.  Pt's uric acid was not back prior to pt leaving, instructed pt that we will call if lab is abnormal.  Uric acid was WNL, so no call necessary.  Pt will return to clinic Thursday for recheck of labs.  Port was flushed with NS and Heparin then de-accessed using 2x2 and papertape.  Pt verbalized understanding of plan of care and return to clinic.     Nasal Fracture

## 2021-05-31 NOTE — PROGRESS NOTES
Hospital admission has been set up and below email sent to notify appropriate staff.    This is an email to notify you that there will is another chemotherapy admission being put on the schedule for 2019 (LABOR DAY).      Name: Chester Gordon  : 1940  MRN# [id number]  Diagnosis: Diffuse Large B-Cell Lymphoma  Ordering Oncologist: Dr Rodriguez so no hospitalist will be needed  Chemo:  High Dose Methotrexate - gets every 6 weeks x3 doses (This is his FIRST dose of this medication)  Time in Hospital: at least 2 days; until serum MTX<0.1 umol/L.    Since the clinic is closed on 19, patient will report to the  of the hospital at 0830.    Consent will be dropped off to P2.    Mari Gamez RN

## 2021-05-31 NOTE — PROGRESS NOTES
Patient was actually waiting in the lobby for results when I was told patient was leaving.  I was able to use the Ipad for  services([place] [id number]).  I gave them all of the information below along with an appt for 8/29/19 at 0800 for port draw.      Mari Gamez RN

## 2021-05-31 NOTE — PROGRESS NOTES
HPI - 78 yo male here for hospital f/u.       Admitted 7/29/19 - 8/2/19 - for chemo and drug monitoring for Diffuse large B cell lymphoma (H)  chart reviewed:   Active Problems:    Diffuse large B-cell lymphoma of lymph nodes of multiple regions (H)    Anemia, chronic disease    Other neutropenia (H)   --Follow up appointment with Specialist: Cancer clinic for labs on Monday, Thursday and then will be seen on Monday 8/12  Follow up test / procedure to do as outpatient: need NEULASTA injection tomorrow (8/3/19) at 2pm    F/u heme/onc clinic yesterday 8/12/19   Note reviewed with patient  -1.He will come to the clinic next Monday at 8 AM for labs and if everything looks good he will then be admitted for his second cycle of R-EPOCH which will be August 19.  I did try to explain to the family that after cycles 2, 4, 6 we will also do high-dose methotrexate due to the aggressive nature of the treatment as well as having testicular involvement.   - Thyroid nodule: This was found on PET scan and this will be something that we will potentially need to follow-up on after his chemo was done.  -pancytopenia - followed with labs  -Groin burning with lower abdominal burning: This is likely just tumor response to chemo.  It has started since the chemo and he has had a great response.  I told him this hopefully will improve now that his cancer is significantly reduced.  I told him he can take Tylenol couple of day as needed to help with the pain.  Let us know if this does not improve.  We will re-evaluate next week.    Today he is reporting continues to have burning and blistering in groin area  He has tylenol and lidocaine ointment for joint pain      He is also having nightmares and waking up screaming this week    H/o gout - on allopurinol, last uric acid was 5.9 on 8/12/19 yesterday  rx for indomethacin prn for flares    H/o Vit D deficiency - improved last level was 38.6 on 2/12/19, on daily 2000 units    Watery eyes - using  "artificial tears and claritin    H/o bilateral knee pain    H/o HTN - on losartan-HCTZ 50-12.5    Polypharmacy - home health RN was setting up meds increased visits from Q2 to weekly  Since starting chemo  Reconciled bottles with med list    Current Outpatient Medications   Medication Sig     acetaminophen (TYLENOL) 500 MG tablet Take 1-2 tablets (500-1,000 mg total) by mouth every 6 (six) hours as needed for pain.     indomethacin (INDOCIN) 50 MG capsule TAKE 1 CAPSULE (50 MG TOTAL) BY MOUTH 2 (TWO) TIMES A DAY FOR GOUT FLARES (Patient taking differently: TAKE 1 CAPSULE (50 MG TOTAL) BY MOUTH 2 (TWO) TIMES A DAY AS NEEDED FOR GOUT FLARES)     lidocaine (XYLOCAINE) 5 % ointment APPLY TO PAINFUL JOINT THREE TIMES A DAY AS NEEDED FOR PAIN     loratadine (CLARITIN) 10 mg tablet TAKE 1 PILL BY MOUTH DAILY AS NEEDED FOR ALLERGY     losartan-hydrochlorothiazide (HYZAAR) 50-12.5 mg per tablet TAKE 1 PILL BY MOUTH DAILY FOR HIGH BLOOD PRESSURE.     polyvinyl alcohol (LIQUIFILM TEARS) 1.4 % ophthalmic solution Administer 2 drops to both eyes as needed.     TAB-A-MARK per tablet TAKE 1 TABLET BY MOUTH DAILY.     allopurinol (ZYLOPRIM) 100 MG tablet Take 2 tablets (200 mg total) by mouth daily.     cholecalciferol, vitamin D3, 2,000 unit Tab TAKE 1 PILL (2,000 UNITS) BY MOUTH EVERY DAY     Vitals:    08/13/19 1412   BP: 108/62   Pulse: 92   Resp: 14   Temp: 98.4  F (36.9  C)   TempSrc: Oral   SpO2: 94%   Weight: 148 lb 1.6 oz (67.2 kg)   Height: 5' 3.23\" (1.606 m)     OBJECTIVE:  Vitals listed above within normal limits  General appearance: well groomed, pleasant, well hydrated, nontoxic appearing  ENT: PERRL, throat clear  Neck: neck supple, no lymphadenopathy, no thyromegaly  Lungs: lungs clear to auscultation bilaterally, no wheezes or rhonchi  Heart: regular rate and rhythm, no murmurs, rubs or gallops  Abdomen: soft, nontender  Neuro: no focal deficits, CN II-XII grossly intact, alert and oriented  Psych:  mood stable, " appears to have good insight and judgment  : large mass in scrotum and fullness of pelvis  Skin: pustules on lower abdomen, not draining and not fluctuant     A/P  Hospital f/u  Reviewed d/c summary, labs and plan        Diffuse large B-cell lymphoma of lymph nodes of multiple regions (H)  Undergoing active chemotherapy treatment   Consult note from heme/onc clinic apt  Yesterday reviewed  Plan for admission next week on 8/19/19 for next round of chemo  Burning sensation and skin pustules, likely related to tumor lysis. Encouraged increasing use of tylenol to 1000mg three times a day and lidocaine gel topically as needed    pancytopenia - heme/onc is monitoring labs    Thyroid nodule - will need evaluation after finished with chemo      nightmares - not related to PTSD or war memories  Only occurred a couple of times and dreaming of monsters. He denies watching scary movies. It does not seem necessary to start medications and will monitor for now.     gout - well controlled with allopurinol  He had indomethacin prn for flares      H/o Vit D deficiency - doing well with daily 2000 units    Watery eyes - continue artificial tears and claritin    H/o bilateral knee pain - continue tylenol and lidocaine gel as needed    H/o HTN - well controlled with current regimen: losartan-HCTZ 50-12.5    Polypharmacy -  Medication reconcilation  Continue weekly home health visits    Spent 40 min face to face with patient with more the 50% spent in counseling, reviewing chart with patient, and coordination of care, medication reconciliation and discussing problems listed above.

## 2021-05-31 NOTE — TELEPHONE ENCOUNTER
RN cannot approve Refill Request    RN can NOT refill this medication med is not covered by policy/route to provider. Last office visit: Visit date not found Last Physical: Visit date not found Last MTM visit: Visit date not found Last visit same specialty: 6/28/2019 Thao Robledo MD.  Next visit within 3 mo: Visit date not found  Next physical within 3 mo: Visit date not found      Chen Kamara, [facility] Triage/Med Refill 8/9/2019    Requested Prescriptions   Pending Prescriptions Disp Refills     cholecalciferol, vitamin D3, 2,000 unit Tab [Pharmacy Med Name: VITAMIN D 2,000 UNIT TABLET 2000 TAB] 90 tablet 3     Sig: TAKE 1 PILL (2,000 UNITS) BY MOUTH EVERY DAY       There is no refill protocol information for this order

## 2021-05-31 NOTE — PROGRESS NOTES
TCM DISCHARGE FOLLOW UP CALL    Discharge Date:  8/2/2019  Reason for hospital stay (discharge diagnosis)::  Diffuse large B cell lymphoma   Are you feeling better, the same or worse since your discharge?:  Patient is feeling better (Appetite good. Dtr states he isn't having abd pain or SOB. He has CP but she wasn't able to to describe where it is. It is non-radiating. No gout pain.)  Do you feel like you have a plan in the event of a health emergency?: Yes (His children)    As part of your discharge plan, were  home care services ordered for you?: No (Nurse who sets up his meds saw him on 8/2.)    Did you receive any new medications, or was there a change to your medications?: Yes    Are you taking those medications, or do you have any established regiment?:  Indocin change. Pt isn't having joint pain ,instructed dtr not to give  It to him unless he has pain. She agreed.  Do you have any follow up visits scheduled with your PCP or Specialist?:  Yes, with PCP and Yes, with Specialist  (RN) Is PCP appt scheduled soon enough (within 14 days of discharge date)?: Yes (8/13)    Who are you seeing and when is it scheduled?:  Oncology 8/12

## 2021-05-31 NOTE — TELEPHONE ENCOUNTER
Call placed to patient and his daughter to give them results of blood work from earlier today.  His labs overall look good but his WBC are running low.  He should practice good hand hygiene and stay away from people that he knows are sick.  We will keep his appt scheduled for Monday 8/12/19 for lab and NP. They verbalized understanding and all questions have been answered.    Mari Gamez RN

## 2021-05-31 NOTE — TELEPHONE ENCOUNTER
RN cannot approve Refill Request    RN can NOT refill this medication med is not covered by policy/route to provider     . Last office visit: 8/31/2017 Sofya Smith MD Last Physical: Visit date not found Last MTM visit: Visit date not found Last visit same specialty: 6/28/2019 Thao Robledo MD.  Next visit within 3 mo: Visit date not found  Next physical within 3 mo: Visit date not found      Della Solomon, [facility] Triage/Med Refill 8/2/2019    Requested Prescriptions   Pending Prescriptions Disp Refills     lidocaine (XYLOCAINE) 5 % ointment [Pharmacy Med Name: LIDOCAINE 5% OINTMENT 5 OINT] 35.44 g 2     Sig: APPLY TO PAINFUL JOINT THREE TIMES A DAY AS NEEDED FOR PAIN       There is no refill protocol information for this order

## 2021-05-31 NOTE — PROGRESS NOTES
[facility] Hematology and Oncology Progress Note    Patient: Chester Gordon  MRN: [id number]  Date of Service: 08/12/2019        Reason for Visit    Chief Complaint   Patient presents with     HE Cancer     Diffuse large B-cell lymphoma of lymph nodes of multiple regions       Assessment and Plan  Cancer Staging  Diffuse large B-cell lymphoma of lymph nodes of multiple regions (H)  Staging form: Hodgkin And Non-Hodgkin Lymphoma, AJCC 8th Edition  - Clinical stage from 7/17/2019: Stage II bulky (Diffuse large B-cell lymphoma) - Signed by Scott Rodriguez MD on 7/17/2019    1.  Diffuse large B-cell lymphoma, activated B-cell type: Patient has an aggressive B-cell lymphoma so we have started R-EPOCH, dose adjusted treatment.  Clinically he has been responding quite well.  He started his ED pocket on July 29.  He will come to the clinic next Monday at 8 AM for labs and if everything looks good he will then be admitted for his second cycle of R-EPOCH which will be August 19.  I did try to explain to the family that after cycles 2, 4, 6 we will also do high-dose methotrexate due to the aggressive nature of the treatment as well as having testicular involvement.  So far patient seems to understand this and has been compliant.    2.  Thyroid nodule: This was found on PET scan and this will be something that we will potentially need to follow-up on after his chemo was done.    3.  Pancytopenia: Patient had his gretta last week which was on day 11.  His platelet count was 61.  His hemoglobin was 9.7.  And his white blood cell count was 0.5.  Did not do a differential because of the total white blood cell count being too low.    4.  Groin burning with lower abdominal burning: This is likely just tumor response to chemo.  It has started since the chemo and he has had a great response.  I told him this hopefully will improve now that his cancer is significantly reduced.  I told him he can take Tylenol couple of day as needed to help  with the pain.  Let us know if this does not improve.  We will re-evaluate next week.      ECOG Performance   ECOG Performance Status: 1     Distress Assessment  Distress Assessment Score: No distress    Pain  Currently in Pain: Yes  Pain Score (Initial OR Reassessment): 6  Location: abdominal 4 and right groin area 6: see above      Problem List    1. Diffuse large B-cell lymphoma of lymph nodes of multiple regions (H)  Injection Appointment   2. Arthralgia of left lower leg  acetaminophen (TYLENOL) 500 MG tablet      ______________________________________________________________________________    History of Present Illness    Chester Gordon is a very pleasant 79 y.o. old male with a history of newly diagnosed DLBCL lymphoma located in the groin measuring 80mm in size presenting with adenopathy and associated with some swelling in groin and legs in June 2018. He presented to Dr. Mayer's clinic with groin swelling. Evaluation revealed that he had bilateral inguinal adenopathy and right testicular mass. This was followed by an ultrasound and a CT scan. The CT scan showed bulky adenopathy in bilateral inguinal region, retroperitoneum and iliac lymph nodes. The largest lymph node measuring almost 5 cm in size. There is also mass on the right testis. It was felt that this could be testicular cancer. Therefore he was referred to urology. He has not seen urology and was referred to us. The patient describes having testicular swelling for only few weeks. The inguinal lymph node have been swollen for about a month and a half. He is also complaining of some itching. Denied any significant fever chills or weight loss. His main symptom is itching on his chest which is mostly likely some other skin condition.      We did a ultrasound-guided biopsy of 1 of his inguinal lymph node that showed diffuse large B-cell lymphoma, activated B-cell subtype.  Cytogenetics showed a BCL 6 rearrangement but no MYC rearrangement and no 14-18  rearrangement.  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  So patient was then instructed to start chemotherapy with dose adjusted R-EPOCH.  He was put in the hospital on July 29 to start his first cycle.  He tolerated that fine.  He is now here for labs.  He states he is doing fine his only complaint is that he feels that his groin and lower abdomen feel like there is intermittent burning and pain.  He says is really been happening since he got his chemo.  All of his lymph nodes are gone and he states that the groin he can no for longer feel them.  Daughter states that he does have fever and chills every day but they actually have not taken his temp but he feels cold and chilled so she gives him Tylenol once a day.  No other new symptoms.    Pain Status  Currently in Pain: Yes    Review of Systems    Oncology Nurse Assessment/CMA Intake: Constitutional  Constitutional (WDL): Exceptions to WDL  Fatigue: No Concerns  Fever: Concerns(occ, resolves with tylenol)  Chills: No Concerns  Weight Gain: No Concerns  Weight Loss: No Concerns  Hot flashes/Night Sweats: No Concerns  Neurosensory  Neurosensory (WDL): Exceptions to WDL  Peripheral Motor Neuropathy: No Concerns  Ataxia: Concerns(uses cane )  Peripheral Sensory Neuropathy: No Concerns  Confusion: No Concerns  Dizziness: No Concerns  Syncope: No Concerns  Eye   Eye Disorder (WDL): Exceptions to WDL  Blurred Vision: No Concerns  Dry Eye: No Concerns  Eye Pain: No Concerns  Watering Eyes: Concerns  Ear  Ear Disorder (WDL): All ear disorder elements are within defined limits  Ear Pain: No Concerns  Tinnitus: No Concerns  Cardiovascular  Cardiovascular (WDL): Exceptions to WDL  Palpitations: No Concerns  Edema: No Concerns  SVT, DVT/PE: No Concerns  Chest Pain - Cardiac: Concerns(laying down occ)  Pulmonary  Respiratory (WDL): Within Defined Limits  Cough: No Concerns  Dyspnea: No Concerns  Hypoxia: No  "Concerns  Gastrointestinal  Gastrointestinal (WDL): Exceptions to WDL(burning sensation in abdomen occ)  Anorexia: Concerns  Nausea: No Concerns  Vomiting: No Concerns  Dehydration: Concerns(not wanting to drink much because using restroom more)  Dysgeusia: No Concerns  Dysphagia: No Concerns  Mucositis Oral: No Concerns  Esophagitis: No Concerns  Constipation: Concerns(occ)  Diarrhea: No Concerns  Pharyngitis: No Concerns  Dry Mouth: No Concerns  Genitourinary  Genitourinary (WDL): Exceptions to WDL  Urinary Frequency: Concerns(with increased fluids)  Urinary Retention: No Concerns  Urinary Tract Pain: No Concerns  Lymphatic  Lymph (WDL): Exceptions to WDL(burning feeling in groin )  Lymphedema: Concerns(right groin)  Lymph Node Discomfort: Concerns(right groin)  Musculoskeletal and Connective Tissue  Musculoskeletal and Connetive Tissue Disorders (WDL): All Musculoskeletal and Connetive Tissue Disorder elements are within defined limits  Arthralgia: No Concerns  Bone Pain: No Concerns  Muscle Weakness : No Concerns  Myalgia: No Concerns  Integumentary  Integumentary (WDL): All integumentary elements are within defined limits  Alopecia: No Concerns  Rash Maculo-Papular: No Concerns  Pruritus: No Concerns  Urticaria: No Concerns  Palmar-Plantar Erythrodysesthesia Syndrome: No Concerns  Flushing: No Concerns  Patient Coping  Patient Coping: Accepting;Open/discussion  Accompanied by  Accompanied by: Family Member  Oral Chemo Adherence         Past History  Past Medical History:   Diagnosis Date     Cognitive disorder      Gout      Hyperlipidemia      Hypertension      Lymphoma (H)        PHYSICAL EXAM:  /64   Pulse 85   Temp 98  F (36.7  C) (Oral)   Ht 5' 3\" (1.6 m)   Wt 150 lb 6.4 oz (68.2 kg)   SpO2 99%   BMI 26.64 kg/m    GENERAL: no acute distress. Cooperative in conversation. Here with family and   HEENT: pupils are equal, round and reactive. Oromucosa is clean and intact. No ulcerations " or mucositis noted. No bleeding noted.  RESP: lungs are clear bilaterally per auscultation. Regular respiratory rate. No wheezes or rhonchi.  CV: Regular, rate and rhythm. No murmurs.  ABD: soft, nontender. Positive bowel sounds. No organomegaly.   MUSCULOSKELETAL: No lower extremity swelling.   NEURO: non focal. Alert and oriented x3.   PSYCH: within normal limits. No depression or anxiety.  SKIN: warm dry intact, port is CDI  LYMPH: no cervical, supraclavicular or axillary lymphadenopathy. I can no longer feel inguinal adenopathy          Lab Results    Recent Results (from the past 168 hour(s))   Uric Acid   Result Value Ref Range    Uric Acid 5.9 3.0 - 8.0 mg/dL   Phosphorus   Result Value Ref Range    Phosphorus 2.5 2.5 - 4.5 mg/dL   Basic Metabolic Panel   Result Value Ref Range    Sodium 136 136 - 145 mmol/L    Potassium 4.0 3.5 - 5.0 mmol/L    Chloride 103 98 - 107 mmol/L    CO2 26 22 - 31 mmol/L    Anion Gap, Calculation 7 5 - 18 mmol/L    Glucose 114 70 - 125 mg/dL    Calcium 9.0 8.5 - 10.5 mg/dL    BUN 19 8 - 28 mg/dL    Creatinine 0.72 0.70 - 1.30 mg/dL    GFR MDRD Af Amer >60 >60 mL/min/1.73m2    GFR MDRD Non Af Amer >60 >60 mL/min/1.73m2   HM1 (CBC with Diff)   Result Value Ref Range    WBC 0.5 (LL) 4.0 - 11.0 thou/uL    RBC 2.94 (L) 4.40 - 6.20 mill/uL    Hemoglobin 9.7 (L) 14.0 - 18.0 g/dL    Hematocrit 27.9 (L) 40.0 - 54.0 %    MCV 95 80 - 100 fL    MCH 33.0 27.0 - 34.0 pg    MCHC 34.8 32.0 - 36.0 g/dL    RDW 13.6 11.0 - 14.5 %    Platelets 61 (L) 140 - 440 thou/uL    MPV 11.0 8.5 - 12.5 fL   Manual Differential   Result Value Ref Range    Platelet Estimate Decreased (!) Normal   Uric Acid   Result Value Ref Range    Uric Acid 5.9 3.0 - 8.0 mg/dL   Phosphorus   Result Value Ref Range    Phosphorus 2.9 2.5 - 4.5 mg/dL   Basic Metabolic Panel   Result Value Ref Range    Sodium 138 136 - 145 mmol/L    Potassium 3.3 (L) 3.5 - 5.0 mmol/L    Chloride 104 98 - 107 mmol/L    CO2 26 22 - 31 mmol/L    Anion  Gap, Calculation 8 5 - 18 mmol/L    Glucose 118 70 - 125 mg/dL    Calcium 8.5 8.5 - 10.5 mg/dL    BUN 18 8 - 28 mg/dL    Creatinine 0.77 0.70 - 1.30 mg/dL    GFR MDRD Af Amer >60 >60 mL/min/1.73m2    GFR MDRD Non Af Amer >60 >60 mL/min/1.73m2   HM1 (CBC with Diff)   Result Value Ref Range    WBC 9.3 4.0 - 11.0 thou/uL    RBC 2.75 (L) 4.40 - 6.20 mill/uL    Hemoglobin 8.9 (L) 14.0 - 18.0 g/dL    Hematocrit 26.0 (L) 40.0 - 54.0 %    MCV 95 80 - 100 fL    MCH 32.4 27.0 - 34.0 pg    MCHC 34.2 32.0 - 36.0 g/dL    RDW 14.2 11.0 - 14.5 %    Platelets 195 140 - 440 thou/uL    MPV 11.2 8.5 - 12.5 fL   Manual Differential   Result Value Ref Range    Total Neutrophils % 72 (H) 50 - 70 %    Lymphocytes % 11 (L) 20 - 40 %    Monocytes % 12 (H) 2 - 10 %    Eosinophils %  1 0 - 6 %    Basophils % 0 0 - 2 %    Metamyelocytes % 1 <=1 %    Myelocytes % 3 (H) <=1 %    Total Neutrophils Absolute 6.7 2.0 - 7.7 thou/ul    Lymphocytes Absolute 1.0 0.8 - 4.4 thou/uL    Monocytes Absolute 1.1 (H) 0.0 - 0.9 thou/uL    Eosinophils Absolute 0.1 0.0 - 0.4 thou/uL    Basophils Absolute 0.0 0.0 - 0.2 thou/uL    Metamyelocytes Absolute 0.1 <=0.1 thou/uL    Myelocytes Absolute 0.3 (H) <=0.1 thou/uL    Platelet Estimate Normal Normal       Imaging    Nm Muga    Result Date: 7/26/2019    The left ventricular ejection fraction is 59.6%.      Ir Port Placement 5+ Years    Result Date: 7/22/2019  [place] [facility] LOCATION: [facility] DATE: 7/22/2019 PROCEDURES: 1.  VENOUS PORT PLACEMENT WITH SUBCUTANEOUS RESERVOIR. 2.  ULTRASOUND GUIDANCE. 3.  FLUOROSCOPY. 4.  CONSCIOUS SEDATION. INDICATION: Diffuse large b-cell lymphoma, lymph nodes of multiple sites. SEDATION: During the time-out, immediately prior to administration of medications, the patient was reassessed for adequacy to receive conscious sedation. Versed  3 mg and fentanyl 100 mcg were administered intravenously with continuous vital signs monitoring by the radiology nurse under my  direct supervision. Total moderate face to face sedation time: 30 minutes. ADDITIONAL MEDICATIONS: Ancef 2 g IV. STERILE BARRIER TECHNIQUE: Maximum sterile barrier technique was used. Cutaneous antisepsis was performed at the operative site with application of 2% chlorhexidine and a full body sterile drape. Prior to the procedure, the  and assistant performed hand hygiene and wore hat, mask, sterile gown, and sterile gloves during the entire procedure. Ultrasound was prepped with a sterile probe cover and sterile gel was used. PROCEDURE: The right side of the neck and anterior chest wall were prepped and draped in a sterile fashion; 1% local lidocaine was infused into the local soft tissues  into the anterior chest wall. Prior to the procedure, patency of the right subclavian vein was confirmed with ultrasound and images recorded. Using real-time ultrasound guidance, the right subclavian vein was accessed percutaneously, and a guidewire was advanced to the right atrial/superior vena caval junction.  A 4 Thai dilator was introduced over the guidewire. A skin incision was  made in the right anterior chest wall. Using blunt dissection, a subcutaneous pocket was made in the chest wall. Electrocautery was used to establish hemostasis. The pocket was  irrigated with saline solution.  The catheter was attached to a PowerPort and a 8 Thai catheter was advanced  through a peel-away sheath in the right subclavian vein. Under fluoroscopic guidance, the catheter was placed at the right atrial/superior vena caval junction. The port was secured in the pocket using 4-0 Prolene. The port flushed and aspirated without difficulty. The pocket was closed using interrupted 4-0 Vicryl . The venotomy incision was closed with 5-0 Vicryl. Dermabond was applied to the skin incisions on the anterior  chest wall. Ultimately, the port was flushed using 3 mL of heparinized flush solution. The patient tolerated the procedure well.  RADIOGRAPHIC SUPERVISION INTERPRETATION: 1.  ULTRASOUND GUIDANCE: Patent right subclavian vein. 2.  FLUOROSCOPY: The tip of the catheter is at the right atrial/superior vena caval junction. FLUOROSCOPIC TIME: 1.6 mins. DOSE: DAP:8 Gycm2.     1. Status post successful placement of right  subclavian PowerPort. CPT: 90536 [id number] The CPT codes are for physician reference only.    Nm Pet Ct Skull To Mid Thigh    Result Date: 7/26/2019  EXAM: NM PET CT SKULL TO MID THIGH LOCATION: [facility] DATE/TIME: 7/26/2019 1:01 PM INDICATION: Diffuse large B-cell lymphoma diagnosed in a right inguinal lymph node 07/12/2019. Initial treatment strategy. COMPARISON: CT 06/13/2019. TECHNIQUE: Serum glucose level 83 mg/dL. One hour post intravenous administration of 7.0 mCi F-18 FDG, PET imaging was performed from the skull base to the mid thighs utilizing attenuation correction with concurrent axial CT and PET/CT image fusion. Dose  reduction techniques were used. FINDINGS: Right testicular mass demonstrates marked FDG activity, SUV max 16.2, consistent with malignancy. Enlarged FDG avid lymph nodes within bilateral inguinal, iliac, mesenteric, para-aortic retroperitoneal, retrocrural, right supraclavicular, lower left cervical, and left axillary regions consistent with lymph node metastases. A representative large mesenteric mass demonstrates SUV max 24.5. Right inguinal adenopathy demonstrates SUV max up to 21.9. FDG avid bilateral adrenal nodules larger on the right, SUV max 11.2, suspicious for metastasis. FDG avid right thyroid nodule concerning for a synchronous thyroid cancer. A right Port-A-Cath is in good position. Coronary artery calcification consistent with coronary artery disease. Degenerative disc disease throughout the spine.     CONCLUSION: 1.  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 2.  Recommend FNA of FDG avid right thyroid nodule to evaluate  for potential synchronous thyroid cancer.      present and was involved in entire conversation        Signed by: Sheryl Saunders CNP

## 2021-05-31 NOTE — PROGRESS NOTES
Chester was in today for labs and possible admission to hospital ([facility]) for chemotherapy.  Labs came back and were reviewed by Dr Rodriguez who reports that he will go ahead with same dosing as last given since his platelets never dropped to below 25 after the last chemo.  Patient was walked over to P2 and handed off to nurse Schafer and patient was put in room 208.  Consent was given to charge nurse Archana.  Chemo orders were already signed.    Mari Gamez RN

## 2021-05-31 NOTE — PROGRESS NOTES
[facility] Care Coordination Contact  TRANSITIONS OF CARE (TYRA) LOG   TYRA tasks should be completed by the CC within one (1) business day of notification of each transition. Follow up contact with member is required after return to their usual care setting.  Note:  If CC finds out about the transitions fifteen (15) days or more after the member has returned to their usual care setting, no TYRA log is needed. However, the CC should check in with the member to discuss the transition process, any changes needed to the care plan and document it in a case note.    Member Name:  Chester Gordon O Name:  Suresh O/Health Plan Member ID#: [id number]   Product: [employer] Care Coordinator Contact:  Rubi Zuniga RN Agency/County/Care System: [facility]   Transition Communication Actions from Care Management Contact   Transition #1   Notification Date: 8-20-19 Transition Date:   8-19-19 Transition From: Home     Is this the member s usual care setting?               yes Transition To: Hospital, [facility]   Transition Type:  Planned  Reason for Admission/Comments:  Chemotherapy     Shared CC contact info, care plan/services with receiving setting--Date completed: 8-19-19   CC received notification of Hospital admission.  Hospital admission occurred on 8-19-19 at [facility] with Dx of chemotherapy  CC contacted Hospital /discharge planner (Luda Trevizo for Name and Phone Number) and reviewed community POC. CC requested to be notified of concerns, care conference dates and discharge planning.   CC reached out to adult daughter Bran Briggs regarding transition and offered support as needed.  Reviewed and update care plan as needed.  Notified community service providers and placed services [facility] transportation on hold as needed.  Transition log initiated.   PCP notified of hospitalization via EMR.    Rubi Zuniga RN  [facility]  [phone]      Notified PCP of transition--Date  completed:  8-19-19     via  EMR   Transition #2   Transition #3  (if applicable)   Notification Date:  8-23-19         Transition To:  Home  Transition Date:   8-23-19    Transition Type:    Planned  Notified PCP -- Date completed:   8-23-19             Shared CC contact info, care plan/services with receiving setting or, if applicable, home care agency--Date completed:  8-23-19   *Complete additional tasks below, if this transition is a return to usual care setting.      Comments:        CC received notification of discharge to home. Discharge occurred on (Date8-23-19) from hospital.   CC contacted adult daughter Bran Briggs and reviewed discharge summary.  Member has a follow-up appointment with PCP in 7 days: No: Offered Assistance with setting up a follow up appointment   Member has had a change in condition: No  Home visit needed: No  Care plan reviewed and updated.  The following home based services [facility] Transportation were resumed.  New referrals placed: No  Transition log completed.   PCP notified of transition back to home via EMR.    Rubi Zuniga RN  [facility]  [phone]      Notification Date:           Transition To:    Transition Date:               Transition Type:    Planned  Notified PCP--Date completed:           Shared CC contact info, care plan/services with receiving setting or, if applicable, home care agency--Date completed:        *Complete additional tasks below, if this transition is a return to usual care setting.      Comments:        *Complete tasks below when the member is discharging TO their usual care setting within one (1) business day of notification.  For situations where the Care Coordinator is notified of the discharge prior to the date of discharge, the Care Coordinator must follow up with the member or designated representative to confirm that discharge actually occurred and discuss required TYRA tasks as outlined in the TYRA Instructions.  (This includes situations  where it may be a  new  usual care setting for the member. (i.e., a community member who decides upon permanent nursing home placement following hospitalization and rehab).    Date completed: 8-23-19  Communicated with member or their designated representative about the following:  care transition process; about changes to the member s health status; plan of care updates; education about transitions and how to prevent unplanned transitions/readmissions  Four Pillars for Optimal Transition:    Check  Yes  - if the member, family member and/or SNF/facility staff manages the following:    If  No  provide explanation in the comments section.          []  Yes     [x]  No     Does the member have a follow-up appointment scheduled with primary care or specialist? (Mental health hospitalizations--the appt. should be w/in 7 days)   [x]  Yes     []  No     Can the member manage their medications or is there a system in place to manage medications (e.g. home care set-up)?         [x]  Yes     []  No     Can the member verbalize warning signs and symptoms to watch for and how to respond?         [x]  Yes     []  No     Does the member use a Personal Health Care Record?  Check  Yes  if visit summary, discharge summary, and/or healthcare summary are being used as a PHR.                                                                                                                                                                                    [] Yes      [] No      Have you updated the member s care plan?  If  No  provide explanation in comments.   Comments:

## 2021-05-31 NOTE — PROGRESS NOTES
Patient in today for labs (cycle 2 day 8).  Per Dr Rodriguez, they are good.  WBC and ANC high due to neulasta.  He needs to come back on 8/11 for labs and will then be admitted to the hospital on 9/2/19 for methotrexate.  He should arrive to the  of the hospital at 0830 and they will get him to P2.  Admission will be set up later today.  Schedulers will be calling pt for appts.    Mari Gamez RN

## 2021-05-31 NOTE — PROGRESS NOTES
Patient arrived to unit ambulatory with use of cane and accompanied by family members.  Reports occasional abdominal pain rated as 4/10, usually treats with tylenol at home.  Given subcutaneous injection of neulasta as ordered in left lower quad with bandaid applied over site.  Tolerated well.

## 2021-05-31 NOTE — PROGRESS NOTES
Emergency room called stating they had an outpatient who stopped at the wrong station.  When they checked vital signs they noted low blood pressure.  They contacted the on call oncologist.  Patient received fluids and blood pressure improved so patient was allowed to discharge home.  Stopped on p2 to receive neulasta injection.   line used during visit.  Patient verbalized no new concerns and follow up in clinic on Monday.  Injection given right arm band aid to site.  Patient discharged home with family per wheelchair.

## 2021-05-31 NOTE — PROGRESS NOTES
Chester Gordon, 79 y.o., male, arrived to Chemo Infusion at 1000 for lab draw.  and family member present. Port easily accessed, labs drawn, and flushed with 20ml Normal Saline and 600 units Heparin. Site covered with gauze and paper tape. Chester Gordon discharged to [facility] at 1030 ambulatory and stable.

## 2021-05-31 NOTE — TELEPHONE ENCOUNTER
RN cannot approve Refill Request    RN can NOT refill this medication med is not covered by policy/route to provider. Last office visit: 5/31/2017 Herbie Bergman MD Last Physical: Visit date not found Last MTM visit: Visit date not found Last visit same specialty: 6/28/2019 Thao Robledo MD.  Next visit within 3 mo: Visit date not found  Next physical within 3 mo: Visit date not found      Chen Kamara, [facility] Triage/Med Refill 8/9/2019    Requested Prescriptions   Pending Prescriptions Disp Refills     acetaminophen (TYLENOL) 500 MG tablet [Pharmacy Med Name: ACETAMINOPHEN 500 MG TABS 500 TAB] 90 tablet 0     Sig: TAKE 1 TABLET (500 MG TOTAL) BY MOUTH EVERY 6 (SIX) HOURS AS NEEDED FOR PAIN.       There is no refill protocol information for this order

## 2021-06-01 VITALS — BODY MASS INDEX: 26.87 KG/M2 | HEIGHT: 64 IN | WEIGHT: 157.4 LBS

## 2021-06-01 VITALS — BODY MASS INDEX: 28.37 KG/M2 | WEIGHT: 160.1 LBS | HEIGHT: 63 IN

## 2021-06-01 VITALS — BODY MASS INDEX: 26.97 KG/M2 | HEIGHT: 63 IN | WEIGHT: 152.2 LBS

## 2021-06-01 VITALS — BODY MASS INDEX: 27.91 KG/M2 | HEIGHT: 63 IN | WEIGHT: 157.5 LBS

## 2021-06-01 NOTE — PROGRESS NOTES
[facility] Care Coordination Contact    TRANSITIONS OF CARE (TYRA) LOG   TYRA tasks should be completed by the CC within one (1) business day of notification of each transition. Follow up contact with member is required after return to their usual care setting.  Note:  If CC finds out about the transitions fifteen (15) days or more after the member has returned to their usual care setting, no TYRA log is needed. However, the CC should check in with the member to discuss the transition process, any changes needed to the care plan and document it in a case note.    Member Name:  Chester Gordon O Name:  Suresh O/Health Plan Member ID#: [id number]   Product: [employer]+ Care Coordinator Contact:  Rubi Zuniga RN Agency/County/Care System: [facility]   Transition Communication Actions from Care Management Contact   Transition #1   Notification Date: 9-2-19 Transition Date:   9-3-19 Transition From: Home     Is this the member s usual care setting?               yes Transition To: Hospital, [place]   Transition Type:  Planned  Reason for Admission/Comments:  Chemotherapy     Shared CC contact info, care plan/services with receiving setting--Date completed: 9-2-19     CC received notification of Hospital admission.  Hospital admission occurred on 9-2-19 at [facility] with Dx of Chemo treatment  CC contacted Hospital /discharge planner (Jessica Garcias for Name and Phone Number) and reviewed community POC. CC requested to be notified of concerns, care conference dates and discharge planning.   CC reached out to adult daughter Bran Briggs regarding transition and offered support as needed.  Reviewed and update care plan as needed.  Notified community service providers and placed services [facility] transportation on hold as needed.  Transition log initiated.   PCP notified of hospitalization via EMR.    Rubi Zuniga RN  [facility]  [phone]      Notified PCP of transition--Date completed:   9-2-19     via  EMR   Transition #2   Transition #3  (if applicable)   Notification Date:  9-5-19         Transition To:  Home  Transition Date:  9-5-19     Transition Type:    Planned  Notified PCP -- Date completed: 9-5-19               Shared CC contact info, care plan/services with receiving setting or, if applicable, home care agency--Date completed:  9-5-19   *Complete additional tasks below, if this transition is a return to usual care setting.      Comments:       CC received notification of discharge to home. Discharge occurred on (Date9-5-19) from [facility].   CC contacted adult daughter Bran Briggs and left a message requesting a return call.  Member has a follow-up appointment with PCP in 7 days: No: Offered Assistance with setting up a follow up appointment   Member has had a change in condition: No  Home visit needed: No  Care plan reviewed and updated.  The following home based services [facility] transportation were resumed.  New referrals placed: No  Transition log completed.   PCP notified of transition back to home via EMR.    Rubi Zuniga RN  [facility]  [phone]          *Complete tasks below when the member is discharging TO their usual care setting within one (1) business day of notification.  For situations where the Care Coordinator is notified of the discharge prior to the date of discharge, the Care Coordinator must follow up with the member or designated representative to confirm that discharge actually occurred and discuss required TYRA tasks as outlined in the TYRA Instructions.  (This includes situations where it may be a  new  usual care setting for the member. (i.e., a community member who decides upon permanent nursing home placement following hospitalization and rehab).    Date completed:  9-5-19  Communicated with member or their designated representative about the following:  care transition process; about changes to the member s health status; plan of care  updates; education about transitions and how to prevent unplanned transitions/readmissions  Four Pillars for Optimal Transition:    Check  Yes  - if the member, family member and/or SNF/facility staff manages the following:    If  No  provide explanation in the comments section.          []  Yes     [x]  No     Does the member have a follow-up appointment scheduled with primary care or specialist? (Mental health hospitalizations--the appt. should be w/in 7 days)   [x]  Yes     []  No     Can the member manage their medications or is there a system in place to manage medications (e.g. home care set-up)?         [x]  Yes     []  No     Can the member verbalize warning signs and symptoms to watch for and how to respond?         [x]  Yes     []  No     Does the member use a Personal Health Care Record?  Check  Yes  if visit summary, discharge summary, and/or healthcare summary are being used as a PHR.                                                                                                                                                                                    [x] Yes      [] No      Have you updated the member s care plan?  If  No  provide explanation in comments.   Comments:

## 2021-06-01 NOTE — PROGRESS NOTES
Pt arrived early for 1200 appointment with family member.  Denies pain or any concerning symptoms at this time. Verbalize understanding of reason for neulasta injection. Tolerated injection well. Site intact. Pt states he has an appointment tomorrow 0800.

## 2021-06-01 NOTE — PROGRESS NOTES
Chester Gordon, 79 y.o., male, arrived to [facility]o Infusion at 1000 for lab draw/port flush. Port easily accessed, labs drawn, and flushed with 20ml Normal Saline and 600 units Heparin. Chester Gordon discharged to [facility] at 1015 ambulatory and stable.

## 2021-06-01 NOTE — PROGRESS NOTES
[facility] Hematology and Oncology Progress Note    Patient: Chester Gordon  MRN: [id number]  Date of Service: 09/23/2019        Reason for Visit    Chief Complaint   Patient presents with     HE Cancer     Diffuse large B-cell lymphoma of lymph nodes of multiple regions       Assessment and Plan  Cancer Staging  Diffuse large B-cell lymphoma of lymph nodes of multiple regions (H)  Staging form: Hodgkin And Non-Hodgkin Lymphoma, AJCC 8th Edition  - Clinical stage from 7/17/2019: Stage II bulky (Diffuse large B-cell lymphoma) - Signed by Scott Rodriguez MD on 7/17/2019    1.  Diffuse large B-cell lymphoma, activated B-cell type: Patient has an aggressive B-cell lymphoma so we have started R-EPOCH, dose adjusted treatment.  Clinically he has been responding quite well.  He started his REPOCH on July 29.  He will get cycle 4 next Monday ,9/30/19. We will get PET scan this week to eval response.     2.  Thyroid nodule: This was found on PET scan and this will be something that we will potentially need to follow-up on after his chemo was done.    3.  Pancytopenia: Patient had his gretta last week which was on day 11.  His platelet count was 33.  His hemoglobin was 6.6.  And his white blood cell count was 0.3.  everything is improved today. Recheck labs next week.  No products needed today.  He did get a blood transfusion last week.  Overall he is stable.  I will discuss with Dr. Rodriguez if he wants to decrease his dose of chemo.    4. Fatigue poor appetite: likely chemo induced and is cumulative. Encourage good hydration, healthy diet with good protein.  Talked about eating small frequent meals instead of 3 big meals a day.  Also encourage daily exercise and to be as active as possible.  Urged him to take small walks throughout the day as well.  Encouraged him not to sleep during the day so he sleeps good at night      ECOG Performance   ECOG Performance Status: 2     Distress Assessment  Distress Assessment Score: No  distress    Pain  Currently in Pain: No/denies  Pain Score (Initial OR Reassessment): No/Denies Pain:       Problem List    1. Diffuse large B-cell lymphoma of lymph nodes of multiple regions (H)     2. Diffuse non-Hodgkin's lymphoma of testis (H)     3. Encounter for antineoplastic chemotherapy        ______________________________________________________________________________    History of Present Illness    Chester Gordon is a very pleasant 79 y.o. old male with a history of newly diagnosed DLBCL lymphoma located in the groin measuring 80mm in size presenting with adenopathy and associated with some swelling in groin and legs in June 2018. He presented to Dr. Mayer's clinic with groin swelling. Evaluation revealed that he had bilateral inguinal adenopathy and right testicular mass. This was followed by an ultrasound and a CT scan. The CT scan showed bulky adenopathy in bilateral inguinal region, retroperitoneum and iliac lymph nodes. The largest lymph node measuring almost 5 cm in size. There is also mass on the right testis. It was felt that this could be testicular cancer. Therefore he was referred to urology. He has not seen urology and was referred to us. The patient describes having testicular swelling for only few weeks. The inguinal lymph node have been swollen for about a month and a half. He is also complaining of some itching. Denied any significant fever chills or weight loss. His main symptom is itching on his chest which is mostly likely some other skin condition.      We did a ultrasound-guided biopsy of 1 of his inguinal lymph node that showed diffuse large B-cell lymphoma, activated B-cell subtype.  Cytogenetics showed a BCL 6 rearrangement but no MYC rearrangement and no 14-18 rearrangement.  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  So patient was then instructed to start chemotherapy with dose adjusted R-EPOCH.  He was put in the  hospital on July 29 to start his first cycle.  Is now completed 3 cycles of this with 1 cycle of high-dose methotrexate.  Overall he is tolerating the treatment pretty well.  He does have appetite issues as well as feeling pretty tired.  Daughter states that he is not doing a whole lot during the day.  He does sleep well.  No bleeding or bruising complications.    Pain Status  Currently in Pain: No/denies    Review of Systems    Oncology Nurse Assessment/CMA Intake: Constitutional  Constitutional (WDL): Exceptions to WDL  Fatigue: Concerns(improved with rest)  Fever: Concerns(100.2)  Chills: Concerns(low grade 100.2 a few days ago)  Neurosensory  Neurosensory (WDL): Exceptions to WDL  Ataxia: Concerns(related to fatigue and weakness)  Eye   Eye Disorder (WDL): Exceptions to WDL  Blurred Vision: Concerns  Ear  Ear Disorder (WDL): All ear disorder elements are within defined limits  Cardiovascular  Cardiovascular (WDL): All cardiovascular elements are within defined limits  Pulmonary  Respiratory (WDL): Exceptions to WDL(snores loudly per daughter)  Dyspnea: Concerns(2-3 times per week)  Gastrointestinal  Gastrointestinal (WDL): Exceptions to WDL(stool incontinence )  Anorexia: Concerns(no desire to eat)  Dehydration: Concerns  Dysgeusia: Concerns  Genitourinary  Genitourinary (WDL): All genitourinary elements are within defined limits  Lymphatic  Lymph (WDL): All lymph disorder elements are within defined limits  Musculoskeletal and Connective Tissue  Musculoskeletal and Connetive Tissue Disorders (WDL): Exceptions to WDL  Muscle Weakness : Concerns(with walking in legs)  Integumentary  Integumentary (WDL): All integumentary elements are within defined limits  Patient Coping  Patient Coping: Accepting;Open/discussion  Accompanied by  Accompanied by: Family Member  Oral Chemo Adherence         Past History  Past Medical History:   Diagnosis Date     Cognitive disorder      Gout      Hyperlipidemia      Hypertension   "    Lymphoma (H)        PHYSICAL EXAM:  BP (!) 83/53   Pulse 88   Temp 98.3  F (36.8  C) (Oral)   Ht 5' 3\" (1.6 m)   Wt 145 lb 3.2 oz (65.9 kg)   SpO2 94%   BMI 25.72 kg/m    GENERAL: no acute distress. Cooperative in conversation. Here with family and   HEENT: pupils are equal, round and reactive. Oromucosa is clean and intact. No ulcerations or mucositis noted. No bleeding noted.  RESP: lungs are clear bilaterally per auscultation. Regular respiratory rate. No wheezes or rhonchi.  CV: Regular, rate and rhythm. No murmurs.  ABD: soft, nontender. Positive bowel sounds. No organomegaly.   MUSCULOSKELETAL: No lower extremity swelling.   NEURO: non focal. Alert and oriented x3.   PSYCH: within normal limits. No depression or anxiety.  SKIN: warm dry intact, port is CDI  LYMPH: no cervical, supraclavicular or axillary lymphadenopathy. I can no longer feel inguinal adenopathy    Lab Results    Recent Results (from the past 168 hour(s))   Crossmatch   Result Value Ref Range    Crossmatch Compatible     Blood Expiration Date [id number]     Unit Type A Pos     Unit Number [id number]     Status Transfused     Component Red Blood Cells     PRODUCT CODE [id number]     Issue Date and Time [id number]     Blood Type 6200     CODING SYSTEM [id number]    Uric Acid   Result Value Ref Range    Uric Acid 5.4 3.0 - 8.0 mg/dL   Phosphorus   Result Value Ref Range    Phosphorus 1.8 (L) 2.5 - 4.5 mg/dL   Basic Metabolic Panel   Result Value Ref Range    Sodium 135 (L) 136 - 145 mmol/L    Potassium 3.5 3.5 - 5.0 mmol/L    Chloride 103 98 - 107 mmol/L    CO2 26 22 - 31 mmol/L    Anion Gap, Calculation 6 5 - 18 mmol/L    Glucose 99 70 - 125 mg/dL    Calcium 8.7 8.5 - 10.5 mg/dL    BUN 19 8 - 28 mg/dL    Creatinine 0.71 0.70 - 1.30 mg/dL    GFR MDRD Af Amer >60 >60 mL/min/1.73m2    GFR MDRD Non Af Amer >60 >60 mL/min/1.73m2   HM1 (CBC with Diff)   Result Value Ref Range    WBC 0.3 (LL) 4.0 - 11.0 thou/uL    RBC 2.36 (L) " 4.40 - 6.20 mill/uL    Hemoglobin 7.5 (L) 14.0 - 18.0 g/dL    Hematocrit 22.3 (L) 40.0 - 54.0 %    MCV 95 80 - 100 fL    MCH 31.8 27.0 - 34.0 pg    MCHC 33.6 32.0 - 36.0 g/dL    RDW 15.7 (H) 11.0 - 14.5 %    Platelets 33 (LL) 140 - 440 thou/uL    MPV 12.8 (H) 8.5 - 12.5 fL    Neutrophils % 24 (L) 50 - 70 %    Lymphocytes % 45 (H) 20 - 40 %    Monocytes % 31 (H) 2 - 10 %    Eosinophils % 0 0 - 6 %    Basophils % 0 0 - 2 %    Neutrophils Absolute 0.1 (L) 2.0 - 7.7 thou/uL    Lymphocytes Absolute 0.1 (L) 0.8 - 4.4 thou/uL    Monocytes Absolute 0.1 0.0 - 0.9 thou/uL    Eosinophils Absolute 0.0 0.0 - 0.4 thou/uL    Basophils Absolute 0.0 0.0 - 0.2 thou/uL   Manual Differential   Result Value Ref Range    Platelet Estimate Decreased (!) Normal    Schistocytes 1+ (!) Negative   Uric Acid   Result Value Ref Range    Uric Acid 6.8 3.0 - 8.0 mg/dL   Phosphorus   Result Value Ref Range    Phosphorus 4.0 2.5 - 4.5 mg/dL   Basic Metabolic Panel   Result Value Ref Range    Sodium 135 (L) 136 - 145 mmol/L    Potassium 3.6 3.5 - 5.0 mmol/L    Chloride 99 98 - 107 mmol/L    CO2 25 22 - 31 mmol/L    Anion Gap, Calculation 11 5 - 18 mmol/L    Glucose 113 70 - 125 mg/dL    Calcium 9.0 8.5 - 10.5 mg/dL    BUN 24 8 - 28 mg/dL    Creatinine 0.90 0.70 - 1.30 mg/dL    GFR MDRD Af Amer >60 >60 mL/min/1.73m2    GFR MDRD Non Af Amer >60 >60 mL/min/1.73m2   HM1 (CBC with Diff)   Result Value Ref Range    WBC 12.5 (H) 4.0 - 11.0 thou/uL    RBC 2.40 (L) 4.40 - 6.20 mill/uL    Hemoglobin 7.6 (L) 14.0 - 18.0 g/dL    Hematocrit 22.2 (L) 40.0 - 54.0 %    MCV 93 80 - 100 fL    MCH 31.7 27.0 - 34.0 pg    MCHC 34.2 32.0 - 36.0 g/dL    RDW 16.0 (H) 11.0 - 14.5 %    Platelets 226 140 - 440 thou/uL    MPV 11.2 8.5 - 12.5 fL   Manual Differential   Result Value Ref Range    Total Neutrophils % 72 (H) 50 - 70 %    Lymphocytes % 5 (L) 20 - 40 %    Monocytes % 4 2 - 10 %    Eosinophils %  0 0 - 6 %    Basophils % 0 0 - 2 %    Metamyelocytes % 12 (H) <=1 %     Myelocytes % 7 (H) <=1 %    Total Neutrophils Absolute 9.0 (H) 2.0 - 7.7 thou/ul    Lymphocytes Absolute 0.6 (L) 0.8 - 4.4 thou/uL    Monocytes Absolute 0.5 0.0 - 0.9 thou/uL    Eosinophils Absolute 0.0 0.0 - 0.4 thou/uL    Basophils Absolute 0.0 0.0 - 0.2 thou/uL    Metamyelocytes Absolute 1.5 (H) <=0.1 thou/uL    Myelocytes Absolute 0.9 (H) <=0.1 thou/uL    Platelet Estimate Normal Normal    Polychromasia 1+ (!) Negative    Target Cells 1+ (!) Negative       Imaging    No results found.   present and was involved in entire conversation        Signed by: Sheryl Saunders, PAM

## 2021-06-01 NOTE — PROGRESS NOTES
[facility] Care Coordination Contact    TRANSITIONS OF CARE (TYRA) LOG   TYRA tasks should be completed by the CC within one (1) business day of notification of each transition. Follow up contact with member is required after return to their usual care setting.  Note:  If CC finds out about the transitions fifteen (15) days or more after the member has returned to their usual care setting, no TYRA log is needed. However, the CC should check in with the member to discuss the transition process, any changes needed to the care plan and document it in a case note.    Member Name:  Chester Gordon O Name:  Suresh O/Health Plan Member ID#: [id number]   Product: MSC+ Care Coordinator Contact:  Rubi Zuniga RN Agency/County/Care System: [facility]   Transition Communication Actions from Care Management Contact   Transition #1   Notification Date: 9-27-19 Transition Date:   9-27-19 Transition From: Home     Is this the member s usual care setting?               yes Transition To: Hospital, [facility]   Transition Type:  Unplanned  Reason for Admission/Comments:  pneumonia     Shared CC contact info, care plan/services with receiving setting--Date completed: 9-27-19  CC received notification of Hospital admission.  Hospital admission occurred on 9-27-19 at [facility] with Dx of pneumonia  CC contacted Hospital /discharge planner (Daniella for Name and Phone Number) and reviewed community POC. CC requested to be notified of concerns, care conference dates and discharge planning.   CC reached out to adult daughter Sha Adorno regarding transition and offered support as needed.  Reviewed and update care plan as needed.  Notified community service providers and placed services [facility] transportation on hold as needed.  Transition log initiated.   PCP notified of hospitalization via EMR.    Rubi Zuniga RN  [facility]  [phone]    Notified PCP of transition--Date completed:   9-27-19     via  EMR   Transition #2   Transition #3  (if applicable)   Notification Date: 9-29-19        Transition To:  n/a  Transition Date: n/a     Transition Type:    Planned  Notified PCP -- Date completed: 9-29-19                Shared CC contact info, care plan/services with receiving setting or, if applicable, home care agency--Date completed:  n/a    *Complete additional tasks below, if this transition is a return to usual care setting.      Comments:  Writer notified by EMR that member passed away 9-29-19.           *Complete tasks below when the member is discharging TO their usual care setting within one (1) business day of notification.  For situations where the Care Coordinator is notified of the discharge prior to the date of discharge, the Care Coordinator must follow up with the member or designated representative to confirm that discharge actually occurred and discuss required TYRA tasks as outlined in the TYRA Instructions.  (This includes situations where it may be a  new  usual care setting for the member. (i.e., a community member who decides upon permanent nursing home placement following hospitalization and rehab).    Date completed: n/a    Communicated with member or their designated representative about the following:  care transition process; about changes to the member s health status; plan of care updates; education about transitions and how to prevent unplanned transitions/readmissions  Four Pillars for Optimal Transition:    Check  Yes  - if the member, family member and/or SNF/facility staff manages the following:    If  No  provide explanation in the comments section.          []  Yes     [x]  No     Does the member have a follow-up appointment scheduled with primary care or specialist? (Mental health hospitalizations--the appt. should be w/in 7 days)   []  Yes     [x]  No     Can the member manage their medications or is there a system in place to manage medications (e.g. home care  set-up)?         []  Yes     [x]  No     Can the member verbalize warning signs and symptoms to watch for and how to respond?         []  Yes     [x]  No     Does the member use a Personal Health Care Record?  Check  Yes  if visit summary, discharge summary, and/or healthcare summary are being used as a PHR.                                                                                                                                                                                    [] Yes      [x] No      Have you updated the member s care plan?  If  No  provide explanation in comments.   Comments:

## 2021-06-01 NOTE — PROGRESS NOTES
[facility] Care Coordination Contact    TRANSITIONS OF CARE (TYRA) LOG   TYRA tasks should be completed by the CC within one (1) business day of notification of each transition. Follow up contact with member is required after return to their usual care setting.  Note:  If CC finds out about the transitions fifteen (15) days or more after the member has returned to their usual care setting, no TYRA log is needed. However, the CC should check in with the member to discuss the transition process, any changes needed to the care plan and document it in a case note.    Member Name:  Chester Gordon O Name:  Suresh O/Health Plan Member ID#: [id number]   Product: MSC+ Care Coordinator Contact:  Rubi Zuniga RN Agency/County/Care System: [facility]   Transition Communication Actions from Care Management Contact   Transition #1   Notification Date: 9-17-19 Transition Date:   9-9-19 Transition From: Home     Is this the member s usual care setting?               yes Transition To: Hospital, [facility]   Transition Type:  Planned  Reason for Admission/Comments:  Chemo treatment     Shared CC contact info, care plan/services with receiving setting--Date completed: n/a  CC received notification of Hospital admission.  Hospital admission occurred on 9-9-19 at [facility] with Dx of Chemo Treatment  CC reached out to adult daughter Bran Briggs regarding transition and offered support as needed.  Reviewed and update care plan as needed.  Notified community service providers and placed services [facility] transportation on hold as needed.  Transition log initiated.   PCP notified of hospitalization via EMR.    Rubi Zuniga RN  [facility]  [phone]       Notified PCP of transition--Date completed:  9-9-19     via  EMR   Transition #2   Transition #3  (if applicable)   Notification Date: 9-17-19         Transition To:  Home  Transition Date: 9-13-19     Transition Type:    Planned  Notified PCP --  Date completed: 9-13-19              Shared CC contact info, care plan/services with receiving setting or, if applicable, home care agency--Date completed:  9-17-19  *Complete additional tasks below, if this transition is a return to usual care setting.      Comments:       CC received notification of discharge to home. Discharge occurred on (Date9-17-19) from [facility].   CC contacted adult daughter Bran Briggs and reviewed discharge summary.  Member has a follow-up appointment with PCP in 7 days: No: Offered Assistance with setting up a follow up appointment   Member has had a change in condition: No  Home visit needed: No  Care plan reviewed and updated.  The following home based services [facility] transportation were resumed.  New referrals placed: No  Transition log completed.   PCP notified of transition back to home via EMR.    Rubi Zuniga RN  [facility]  [phone]          *Complete tasks below when the member is discharging TO their usual care setting within one (1) business day of notification.  For situations where the Care Coordinator is notified of the discharge prior to the date of discharge, the Care Coordinator must follow up with the member or designated representative to confirm that discharge actually occurred and discuss required TYRA tasks as outlined in the TYRA Instructions.  (This includes situations where it may be a  new  usual care setting for the member. (i.e., a community member who decides upon permanent nursing home placement following hospitalization and rehab).    Date completed: 9-17-19  Communicated with member or their designated representative about the following:  care transition process; about changes to the member s health status; plan of care updates; education about transitions and how to prevent unplanned transitions/readmissions  Four Pillars for Optimal Transition:    Check  Yes  - if the member, family member and/or SNF/facility staff manages the  following:    If  No  provide explanation in the comments section.          [x]  Yes     []  No     Does the member have a follow-up appointment scheduled with primary care or specialist? (Mental health hospitalizations--the appt. should be w/in 7 days)   [x]  Yes     []  No     Can the member manage their medications or is there a system in place to manage medications (e.g. home care set-up)?         [x]  Yes     []  No     Can the member verbalize warning signs and symptoms to watch for and how to respond?         [x]  Yes     []  No     Does the member use a Personal Health Care Record?  Check  Yes  if visit summary, discharge summary, and/or healthcare summary are being used as a PHR.                                                                                                                                                                                    [x] Yes      [] No      Have you updated the member s care plan?  If  No  provide explanation in comments.   Comments:  n/a

## 2021-06-01 NOTE — TELEPHONE ENCOUNTER
Patient was in today for lab only appt.  He went home.  Lab results are back and have been reviewed by Sheryl Saunders CNP.  WBC 0.3 ANC Plt 33 and Hgb 7.5.  Patient should practice good hand hygeine as well as stay away from people he knows are sick and checks his temperature daily or more frequently if needed.  We expect his counts to be low like this and we will recheck on Monday 9/23/19 at his 1315 appt for lab and NP.       services was used.  [facility] ID#[id number].  No answer on his preferred line.  Tried a second time and we were able to get a hold of them.  I spoke with one of his daughters who attends his appts with him.  I explained the above message to her.  She verbalized understanding.      Mari Gamez RN

## 2021-06-01 NOTE — PROGRESS NOTES
Hgb 6.6.  Per Dr Rodriguez, will get 1 unit of blood today.   was here and patient is agreeable.  Consent signed and pt brought down to first floor infusion.    Mari Gamez RN

## 2021-06-01 NOTE — PROGRESS NOTES
Pt arrived amb with his  cane, from cancer care, vs's   here, went over blood transfusion info, information about blood transfusion,   left . So when blood ready , call for , verified pt name with ID card, and went over blood information, and consent,  , vs's ,pt ashley lunch, blood finished, iv's stable, Port flush with ns then heparin Needle dcd after and  Language line called, went over dcd instructions and f/o appointment, and who is picking him up. Pt given his AVs and brought to waiting to meet his family at 1300  Patricia Brower

## 2021-06-02 VITALS — HEIGHT: 63 IN | WEIGHT: 160 LBS | BODY MASS INDEX: 28.35 KG/M2

## 2021-06-03 VITALS
WEIGHT: 145.2 LBS | HEIGHT: 63 IN | OXYGEN SATURATION: 94 % | SYSTOLIC BLOOD PRESSURE: 83 MMHG | BODY MASS INDEX: 25.73 KG/M2 | HEART RATE: 88 BPM | DIASTOLIC BLOOD PRESSURE: 53 MMHG | TEMPERATURE: 98.3 F

## 2021-06-03 VITALS — HEIGHT: 64 IN | WEIGHT: 149.9 LBS | BODY MASS INDEX: 25.59 KG/M2

## 2021-06-03 VITALS — BODY MASS INDEX: 27.54 KG/M2 | HEIGHT: 63 IN | WEIGHT: 155.4 LBS

## 2021-06-03 VITALS — WEIGHT: 153 LBS | HEIGHT: 64 IN | BODY MASS INDEX: 26.12 KG/M2

## 2021-06-03 VITALS — WEIGHT: 155 LBS | BODY MASS INDEX: 27.46 KG/M2 | HEIGHT: 63 IN

## 2021-06-03 VITALS — BODY MASS INDEX: 26.59 KG/M2 | WEIGHT: 155.75 LBS | HEIGHT: 64 IN

## 2021-06-03 VITALS — BODY MASS INDEX: 26.65 KG/M2 | WEIGHT: 150.4 LBS | HEIGHT: 63 IN

## 2021-06-03 VITALS — BODY MASS INDEX: 27.82 KG/M2 | HEIGHT: 63 IN | WEIGHT: 157 LBS

## 2021-06-03 VITALS — BODY MASS INDEX: 24.8 KG/M2 | WEIGHT: 140 LBS

## 2021-06-03 VITALS — BODY MASS INDEX: 27.77 KG/M2 | WEIGHT: 156.7 LBS | HEIGHT: 63 IN

## 2021-06-03 VITALS — WEIGHT: 155.1 LBS | BODY MASS INDEX: 27.04 KG/M2

## 2021-06-03 VITALS — HEIGHT: 63 IN | BODY MASS INDEX: 26.24 KG/M2 | WEIGHT: 148.1 LBS

## 2021-06-03 NOTE — PROGRESS NOTES
[facility] Care Coordination Contact    Writer informed that member passed away 9-.  [id number] sent to [facility].  Death Notification sent to [facility].  MMIS EW exit entry completed. CMS notified.    Rubi Zuniga RN  [facility]  [phone]

## 2021-06-08 NOTE — PROGRESS NOTES
Chief Complaint   Patient presents with     Leg and hand swelling     Leg i a little better now but right hand is still very painful.. Pt described pain as burning, and hard for patient to grasp things. Red and swollen on right hand.         HPI:   Chester Gordon is a 77 y.o. male with  and grandson in for pain and swelling of right wrist.  Patient has a history of gout.  Wrist red and swollen for the last two days.  No fever.  He has indomethacin but is out of it.    ROS:  Constitutional: no fever  Eyes: negative   ENT: negative   Respiratory: negative    CV: negative   GI: negative   : negative   SKIN: negative   MS: a per HPI  NEURO: negative      Medications:  Current Outpatient Prescriptions on File Prior to Visit   Medication Sig Dispense Refill     D-2000 2,000 unit cap TAKE ONE CAPSULE BY MOUTH DAILY. 90 each 3     hydrochlorothiazide (HYDRODIURIL) 25 MG tablet Take 1 tablet (25 mg total) by mouth daily. 30 tablet 11     lisinopril (PRINIVIL,ZESTRIL) 5 MG tablet Take 1 tablet (5 mg total) by mouth daily. 30 tablet 11     loratadine (ALLERGY RELIEF, LORATADINE,) 10 mg tablet TAKE ONE TABLET BY MOUTH DAILY  AS NEEDED 30 tablet 10     multivitamin (ONE DAILY) per tablet Take 1 tablet by mouth daily. 100 tablet 3     acetaminophen (TYLENOL EXTRA STRENGTH) 500 MG tablet Take 1 tablet (500 mg total) by mouth every 6 (six) hours as needed for pain. 90 tablet 1     allopurinol (ZYLOPRIM) 100 MG tablet Take 2 tablets (200 mg total) by mouth daily. 30 tablet 11     guaiFENesin 50 mg/5 mL Liqd Take 5 mL by mouth daily as needed. 118 mL 1     indomethacin (INDOCIN) 50 MG capsule Take 1 capsule (50 mg total) by mouth 3 (three) times a day with meals. 15 capsule 0     No current facility-administered medications on file prior to visit.          Social History:  Social History   Substance Use Topics     Smoking status: Current Every Day Smoker     Packs/day: 0.25     Years: 70.00     Types: Cigarettes      "Smokeless tobacco: Never Used      Comment: chews betel nut, smokes 2 cig/day     Alcohol use No         Physical Exam:   Vitals:    01/23/17 1537   BP: 102/74   Patient Site: Left Arm   Patient Position: Sitting   Cuff Size: Adult Regular   Pulse: 92   Resp: 24   Temp: 98.4  F (36.9  C)   TempSrc: Oral   Weight: 152 lb 4 oz (69.1 kg)   Height: 5' 3.5\" (1.613 m)       GEN: alert.  Right upper extremity:  Erythema over 1st MCP joint, very tender.        Assessment/Plan:    1. Acute gout  indomethacin (INDOCIN) 50 MG capsule   2. Gout  allopurinol (ZYLOPRIM) 100 MG tablet      As near as I can tell, he is not taking the allopurinol.  I have sent another prescription over for him and discussed with his grandson and the patient that this is a medication to stay on and take daily to prevent attacks of gout.  The indomethicin is for acute treatment and should relieve pain in 1-2 days of taking it  Can put some ice or cold on it.  Recheck if not improving.      The following portions of the patient's history were reviewed and updated as appropriate: allergies, current medications, past family history, past medical history, past social history, past surgical history and problem list.    Thao Robledo MD      1/23/2017        "

## 2021-06-10 NOTE — PROGRESS NOTES
"S:  76 yo male who is brought in by his daughter for fevers last week.  He had high fevers, and was unable to eat.  He is feeling better now.  He is able to eat and drink.  Last week he felt cold and chills, alternating with feeling sweaty.    No vomiting.  He had a cough and nausea.  He still has a cough.  He is feeling a bit sobr.  He sometimes uses pillows at night to sleep. No swelling in his legs.  No pain in his chest.  He has pain in his right wrist.  No problems with his bowels or bladder.  He has some mild constipation, which is normal for him.  No blood in his stools.  No abdominal pain.    No headaches.    He was having body aches last week.  He is now urinating normally.    No ill contacts at home.  No falls.  He is not sobr with walking.    He smokes 2-3 pipes daily.  No current headache.  No n/t/w in any part of his body.  No difficulty with speaking or walking.      O:  /76  Pulse 66  Temp 98.3  F (36.8  C) (Oral)   Resp 20  Ht 5' 3.5\" (1.613 m)  Wt 154 lb (69.9 kg)  SpO2 97%  BMI 26.85 kg/m2  Gen: no acute distress  Heent:  Edentulous.  Moist mucus membranes.  Sclera are mildly erythematous.    Neck:  Supple, no lad, no carotid bruits  Heart:  Regular rate and rhythm.  No m/r/g  Lungs: cta bilaterally, no wheezes or rhonchi.  Good air inspiration  Abdomen:  No masses or organomegaly.  Soft.  Normal bowel sounds.    Extremities:  No edema.     Neuro:  Grossly neurologically intact.    Walks with a cane.            Patient Active Problem List   Diagnosis     Muscle Weakness Generalized     Memory Lapses Or Loss     Joint Pain, Localized In The Knee     Onychomycosis     Vitamin D Deficiency     Gout     Bronchiectasis     Midback Pain     Hypercholesterolemia     Cognitive Disorder     HTN (hypertension)     Current Outpatient Prescriptions on File Prior to Visit   Medication Sig Dispense Refill     allopurinol (ZYLOPRIM) 100 MG tablet Take 2 tablets (200 mg total) by mouth daily. 30 " tablet 11     cholecalciferol, vitamin D3, (D3-2000) 2,000 unit cap Take 2,000 Units/day by mouth once daily. 90 each 1     hydrochlorothiazide (HYDRODIURIL) 25 MG tablet Take 1 tablet (25 mg total) by mouth daily. 30 tablet 11     lisinopril (PRINIVIL,ZESTRIL) 5 MG tablet Take 1 tablet (5 mg total) by mouth daily. 30 tablet 11     loratadine (ALLERGY RELIEF, LORATADINE,) 10 mg tablet TAKE ONE TABLET BY MOUTH DAILY  AS NEEDED 30 tablet 10     multivitamin (ONE DAILY) per tablet Take 1 tablet by mouth daily. 100 tablet 3     indomethacin (INDOCIN) 50 MG capsule Take one capsule twice daily po for gout flare. 15 capsule 0     indomethacin (INDOCIN) 50 MG capsule Take 1 capsule (50 mg total) by mouth 2 (two) times a day. For gout flares 15 capsule 1     [DISCONTINUED] acetaminophen (TYLENOL EXTRA STRENGTH) 500 MG tablet Take 1 tablet (500 mg total) by mouth every 6 (six) hours as needed for pain. 90 tablet 1     No current facility-administered medications on file prior to visit.           Recent Results (from the past 48 hour(s))   Basic Metabolic Panel    Collection Time: 04/17/17  9:39 AM   Result Value Ref Range    Sodium 137 136 - 145 mmol/L    Potassium 4.4 3.5 - 5.0 mmol/L    Chloride 97 (L) 98 - 107 mmol/L    CO2 30 22 - 31 mmol/L    Anion Gap, Calculation 10 5 - 18 mmol/L    Glucose 112 70 - 125 mg/dL    Calcium 9.7 8.5 - 10.5 mg/dL    BUN 20 8 - 28 mg/dL    Creatinine 0.92 0.70 - 1.30 mg/dL    GFR MDRD Af Amer >60 >60 mL/min/1.73m2    GFR MDRD Non Af Amer >60 >60 mL/min/1.73m2         Assessment/Plan:  1. Fever  Now resolved.  Encouraged to hydrate and eat well.  Return if any sx worsen.    Check kidney function and electrolytes given recent history of poor oral intake.   Tylenol rx and isntructions given for fevers in the future.    - Basic Metabolic Panel    The entire conversation today was conducted through the use of a professional .        Marbella Gama   4/17/2017 9:18 AM

## 2021-06-11 NOTE — PROGRESS NOTES
"HPI - 76 yo male here to f/u on hemoptysis.    He was seen by Dr. Bergman on 5/31/17 for hemoptysis.   CR was normal  H/o neg QFT 2013 and no new TB-like sx  rx for tesssalon pearls which helped his cough.   He has not had any blood or cough for several weeks.     Gout -   On allopurinol  Last uric acid 8.8 on 10/2016    HTN -   BP good on lisinopril and HTCZ  BMP wnl 4/2017    Vit D deficiency -   Last level 38.2 on 5/2016 on daily Vit D    Current Outpatient Prescriptions   Medication Sig     acetaminophen (TYLENOL EXTRA STRENGTH) 500 MG tablet Take 1 tablet (500 mg total) by mouth every 6 (six) hours as needed for pain.     allopurinol (ZYLOPRIM) 100 MG tablet Take 2 tablets (200 mg total) by mouth daily.     cholecalciferol, vitamin D3, (D3-2000) 2,000 unit cap Take 2,000 Units/day by mouth once daily.     hydrochlorothiazide (HYDRODIURIL) 25 MG tablet Take 1 tablet (25 mg total) by mouth daily.     indomethacin (INDOCIN) 50 MG capsule Take 1 capsule (50 mg total) by mouth 2 (two) times a day. For gout flares     lisinopril (PRINIVIL,ZESTRIL) 5 MG tablet Take 5 mg by mouth daily.     loratadine (ALLERGY RELIEF, LORATADINE,) 10 mg tablet TAKE ONE TABLET BY MOUTH DAILY  AS NEEDED     multivitamin (ONE DAILY) per tablet Take 1 tablet by mouth daily.     Vitals:    06/29/17 1343   BP: 114/68   Patient Site: Left Arm   Patient Position: Sitting   Cuff Size: Adult Regular   Pulse: 76   Resp: 16   Temp: 98.4  F (36.9  C)   TempSrc: Oral   SpO2: 94%   Weight: 154 lb 4.8 oz (70 kg)   Height: 5' 3.5\" (1.613 m)       PHYSICAL EXAM   General Appearance: Awake and alert, in no acute distress  HEENT: neck is supple  CV: regular rate  Resp: No respiratory distress. Breathing comfortably  Musculoskeletal: moving limbs comfortably with not deficits or deformities  Skin: no rashes noted    A/P  hemoptysis.   CR was normal  H/o neg QFT 2013 and no new TB-like sx  rx for tesssalon pearls which helped his cough.   He has not had any " blood or cough for several weeks.     Gout -   On allopurinol  Last uric acid 8.8 on 10/2016  recheck    HTN -   BP good on lisinopril and HTCZ  BMP wnl 4/2017    Vit D deficiency -   Last level 38.2 on 5/2016 on daily Vit D  Recheck     Polypharmacy - home RN sets up med box    Spent 25 min face to face with patient with more the 50% spent in counseling, reviewing chart, and coordination of care and discussing problems listed above.

## 2021-06-11 NOTE — PROGRESS NOTES
ASSESMENT AND PLAN:  Diagnoses and all orders for this visit:    Coughing up blood  -     XR Chest PA and Lateral  No cavities suggestive of TB or consolidation on x-ray.  Symptomatic management with Tessalon for now.   Instructed to call if symptoms worsen , otherwise he will follow-up in 2-3 weeks.  Defer TB screening test, negative TB QuantiFERON gold test in 2013.  No history of chronic cough, weight loss, chronic fever or night sweats.      SUBJECTIVE: Chester Gordon is here with cough with bloodstained sputum for the past 3 days.  The small amount of fresh blood mixed in the sputum occasionally.  No fever or chills.  No acute shortness of breath or wheezing.  He had similar problems in the past.  He was seen for the same problem in October 2014.  Chest x-ray was negative.  The symptoms resolved spontaneously.  Never been treated for active TB.  His last chest x-ray was in February 2016 which only showed borderline cardiomegaly, lungs were clear with no pneumothorax or effusion.      Past Medical History:   Diagnosis Date     Cognitive disorder      Gout      Hyperlipidemia      Patient Active Problem List   Diagnosis     Muscle Weakness Generalized     Memory Lapses Or Loss     Joint Pain, Localized In The Knee     Onychomycosis     Vitamin D Deficiency     Gout     Bronchiectasis     Midback Pain     Hypercholesterolemia     Cognitive Disorder     HTN (hypertension)       Allergies:    Allergies   Allergen Reactions     No Known Drug Allergies        History   Smoking Status     Current Every Day Smoker     Packs/day: 0.25     Years: 70.00     Types: Cigarettes   Smokeless Tobacco     Current User     Comment: chews betel nut, smokes 2 cig/day       Review of systems otherwise negative except as listed in HPI.   History   Smoking Status     Current Every Day Smoker     Packs/day: 0.25     Years: 70.00     Types: Cigarettes   Smokeless Tobacco     Current User     Comment: chews betel nut, smokes 2 cig/day        OBJECTICE: /76 (Patient Site: Right Arm, Patient Position: Sitting, Cuff Size: Adult Regular)  Pulse 72  Temp 98.2  F (36.8  C) (Oral)   Resp 20  Wt 154 lb 5 oz (70 kg)  BMI 26.91 kg/m2    DATA REVIEWED:    Radiology Tests Summarized or Ordered (1): No acute changes on chest x-ray today.  Labs Reviewed or Ordered (1): Negative TB QuantiFERON gold test in January 2013.      GEN-alert,  in no apparent distress.  HEENT-mucous membranes are moist, neck is supple.  CV-regular rate and rhythm with no murmur.   RESP-lungs clear to auscultation .  ABDOMEN- Soft , not tender.  EXTREM- No edema.  SKIN-normal        [place] Madalyn   5/31/2017

## 2021-06-12 NOTE — PROGRESS NOTES
Assessment/Plan:        Diagnoses and all orders for this visit:    Acute idiopathic gout of left ankle- he ran out of his indomethacin and didn't know he could get more at the pharmacy.  I explained to him about refills and that he can to the pharmacy when he needs more of the medicine for a gout flare.  He requested a topical medication also.   -     indomethacin (INDOCIN) 50 MG capsule; TAKE 1 CAPSULE (50 MG TOTAL) BY MOUTH 2 (TWO) TIMES A DAY FOR GOUT FLARES  Dispense: 30 capsule; Refill: 10  -     lidocaine (XYLOCAINE) 5 % ointment; Apply to painful joint three times a day as needed for pain  Dispense: 35.44 g; Refill: 2      Need for immunization against influenza  -     Influenza, Seasonal,Quad Inj, 36+ MOS    Essential hypertension- controlled.  Continue current medication    Vitamin D deficiency- he has the vitamin D tablets and multivitamins with him today- he hasn't started taken them because he doesn't know what they are and what they are for.  I explained to him what they are for and explained to him and his family member how they should be taken.                 Subjective:    Patient ID: Chester Gordon is a 77 y.o. male.    HPI:  Has had pain and swelling on the outside of his left ankle for 2 days. Was taking indomethacin for pain and swelling in his right wrist.  That is gone now.Taking his other meds as prescribed except for vitamin D and multivit- he got them from the pharmacy and doesn't know what they are for or how to take them.       The following portions of the patient's history were reviewed and updated as appropriate: allergies, current medications, past family history, past medical history, past social history, past surgical history and problem list.    Review of Systems  12 sys rev neg other than HPI        Objective:    Physical Exam   Patient is in no apparent physical distress.  Vitals are as recorded.  Head and face are normal.  Conjunctiva are clear.  Neck is without adenopathy or  masses.thyroid not enlarged.   Cardiovascular :  Regular rate and rhythm with no murmurs.  Lungs are clear with good air movement bilaterally.  Extremities edema over left lateral malleolus, no increased warmth or redness.   Gait is normal.  Skin is without rashes.  Mood and affect are appropriate.

## 2021-06-13 NOTE — PROGRESS NOTES
"HPI - 78 yo male here with foot pain and f/u on HTN.       HTN -   No bottle for HCTZ , only bottle for lisinopril    Swollen painful left foot   H/o gout  He was previously on allopurinol 200mg daily   Last uric acid 6.5 on 6/29/17  Recently given rx for indomethacin on 8/31/17 for gout        Current Outpatient Prescriptions   Medication Sig     allopurinol (ZYLOPRIM) 100 MG tablet Take 2 tablets (200 mg total) by mouth daily.     indomethacin (INDOCIN) 50 MG capsule TAKE 1 CAPSULE (50 MG TOTAL) BY MOUTH 2 (TWO) TIMES A DAY FOR GOUT FLARES     lidocaine (XYLOCAINE) 5 % ointment Apply to painful joint three times a day as needed for pain     loratadine (ALLERGY RELIEF, LORATADINE,) 10 mg tablet TAKE ONE TABLET BY MOUTH DAILY  AS NEEDED     MAPAP EXTRA STRENGTH 500 mg tablet TAKE 1 PILL BY MOUTH EVERY 6 HOURS AS NEEDED FOR PAIN     multivitamin (ONE DAILY) per tablet Take 1 tablet by mouth daily.     VITAMIN D3 2,000 unit capsule TAKE 1 PILL (2,000 UNITS) BY MOUTH EVERY DAY     hydroCHLOROthiazide (HYDRODIURIL) 25 MG tablet Take 1 tablet (25 mg total) by mouth daily.     lisinopril (PRINIVIL,ZESTRIL) 5 MG tablet TAKE 1 PILL BY MOUTH DAILY FOR HIGH BLOOD PRESSURE.     lisinopril (PRINIVIL,ZESTRIL) 5 MG tablet Take 5 mg by mouth daily.       Vitals:    10/02/17 1447   BP: 130/80   Pulse: 64   Resp: 16   Temp: 98.3  F (36.8  C)   TempSrc: Oral   SpO2: 97%   Weight: 156 lb 12.8 oz (71.1 kg)   Height: 5' 3.25\" (1.607 m)     OBJECTIVE:  Vitals listed above within normal limits  General appearance: well groomed, pleasant, well hydrated, nontoxic appearing  ENT: PERRL, throat clear  Neck: neck supple, no lymphadenopathy, no thyromegaly  Lungs: lungs clear to auscultation bilaterally, no wheezes or rhonchi  Heart: regular rate and rhythm, no murmurs, rubs or gallops  Abdomen: soft, nontender  Neuro: no focal deficits, CN II-XII grossly intact, alert and oriented  Psych:  mood stable, appears to have good insight and " judgment  MSK: left lateral mid foot pain with bony tenderness, good pedal pulses but both feet are purplish and blanchable    AXR - no obvious fracture, some calcification of joints  Await official radiology report    A/P  Left mid foot pain   Gout - He was previously on allopurinol 200mg daily   Last uric acid 6.5 on 6/29/17  Recently given rx for indomethacin on 8/31/17 for gout  Check Xray  Encouraged to restart indomethacin  Continue allopurinol    HTN -   BP good today but missing his HCTZ  Encouraged to get the refill and given med list to show RN    Polypharmacy - reports a home RN sets up meds

## 2021-06-15 NOTE — PROGRESS NOTES
"HPI - 77 yo male here to f/u on HTN, gout, and cough.       Gout - pain is better after taking meds  On allopurinol  Uric acid 8.8 -> 6.5    HTN - BP is good today   lisinpril and HCTZ on med list no bottle for HCTZ    Cough  -   No fever  On lisinopril    Vit D 46.0 on 6/2017 0n daily Vit D    Current Outpatient Prescriptions   Medication Sig     allopurinol (ZYLOPRIM) 100 MG tablet TAKE 2 TABLETS (200 MG TOTAL) BY MOUTH DAILY.     indomethacin (INDOCIN) 50 MG capsule TAKE 1 CAPSULE (50 MG TOTAL) BY MOUTH 2 (TWO) TIMES A DAY FOR GOUT FLARES     lidocaine (XYLOCAINE) 5 % ointment Apply to painful joint three times a day as needed for pain     lisinopril (PRINIVIL,ZESTRIL) 5 MG tablet TAKE 1 PILL BY MOUTH DAILY FOR HIGH BLOOD PRESSURE.     loratadine (CLARITIN) 10 mg tablet TAKE 1 PILL BY MOUTH DAILY AS NEEDED FOR ALLERGY     MAPAP EXTRA STRENGTH 500 mg tablet TAKE 1 PILL BY MOUTH EVERY 6 HOURS AS NEEDED FOR PAIN     multivitamin (ONE DAILY) per tablet Take 1 tablet by mouth daily.     VITAMIN D3 2,000 unit capsule TAKE 1 PILL (2,000 UNITS) BY MOUTH EVERY DAY     hydroCHLOROthiazide (HYDRODIURIL) 25 MG tablet Take 1 tablet (25 mg total) by mouth daily.     Vitals:    01/04/18 1414   BP: 122/70   Pulse: (!) 106   Resp: 16   Temp: 98.1  F (36.7  C)   TempSrc: Oral   SpO2: 96%   Weight: 156 lb 8 oz (71 kg)   Height: 5' 3.25\" (1.607 m)     OBJECTIVE:  Vitals listed above within normal limits  General appearance: well groomed, pleasant, well hydrated, nontoxic appearing  ENT: PERRL, throat clear  Neck: neck supple, no lymphadenopathy, no thyromegaly  Lungs: lungs clear to auscultation bilaterally, no wheezes or rhonchi  Heart: regular rate and rhythm, no murmurs, rubs or gallops  Abdomen: soft, nontender  Neuro: no focal deficits, CN II-XII grossly intact, alert and oriented  Psych:  mood stable, appears to have good insight and judgment    A/P  Gout - pain is better after taking meds  On allopurinol daily and " indomethacin prn about daily  Uric acid 8.8 -> 6.5   recheck    HTN - BP is good today   lisinpril and HCTZ on med list no bottle for HCTZ  Will stop lisinopril b/c cough  Not taking HCTZ anyway  Start amlodipine  Check CMP    Cough  -   stop lisinopril    Vit D 46.0 on 6/2017 0n daily Vit D  Recheck level    Polypharmacy -   Home RN sets up meds    Spent 25 min face to face with patient with more the 50% spent in counseling, reviewing chart, and coordination of care and discussing problems listed above.

## 2021-06-16 PROBLEM — D70.8 OTHER NEUTROPENIA (H): Status: ACTIVE | Noted: 2019-01-01

## 2021-06-16 PROBLEM — E78.2 MIXED HYPERLIPIDEMIA: Status: ACTIVE | Noted: 2019-01-01

## 2021-06-16 PROBLEM — J18.9 PNEUMONIA: Status: ACTIVE | Noted: 2019-01-01

## 2021-06-16 PROBLEM — Z59.6 POVERTY: Status: ACTIVE | Noted: 2019-01-01

## 2021-06-16 PROBLEM — G93.40 ENCEPHALOPATHY: Status: ACTIVE | Noted: 2019-01-01

## 2021-06-16 PROBLEM — Z51.81 THERAPEUTIC DRUG MONITORING: Status: ACTIVE | Noted: 2019-01-01

## 2021-06-16 PROBLEM — Z60.3 LANGUAGE BARRIER AFFECTING HEALTH CARE: Status: ACTIVE | Noted: 2019-01-01

## 2021-06-16 PROBLEM — C83.38 DIFFUSE LARGE B-CELL LYMPHOMA OF LYMPH NODES OF MULTIPLE REGIONS (H): Status: ACTIVE | Noted: 2019-01-01

## 2021-06-16 PROBLEM — Z79.899 POLYPHARMACY: Status: ACTIVE | Noted: 2017-06-29

## 2021-06-16 PROBLEM — Z22.7 LTBI (LATENT TUBERCULOSIS INFECTION): Status: ACTIVE | Noted: 2019-01-01

## 2021-06-16 PROBLEM — Z86.2 HISTORY OF ANEMIA: Status: ACTIVE | Noted: 2019-01-01

## 2021-06-16 PROBLEM — Z75.8 LANGUAGE BARRIER AFFECTING HEALTH CARE: Status: ACTIVE | Noted: 2019-01-01

## 2021-06-16 PROBLEM — J96.01 ACUTE RESPIRATORY FAILURE WITH HYPOXEMIA (H): Status: ACTIVE | Noted: 2019-01-01

## 2021-06-16 PROBLEM — Z51.11 ENCOUNTER FOR ANTINEOPLASTIC CHEMOTHERAPY: Status: ACTIVE | Noted: 2019-01-01

## 2021-06-16 PROBLEM — I10 ESSENTIAL HYPERTENSION: Status: ACTIVE | Noted: 2019-01-01

## 2021-06-16 PROBLEM — D63.8 ANEMIA, CHRONIC DISEASE: Status: ACTIVE | Noted: 2019-01-01

## 2021-06-16 NOTE — PROGRESS NOTES
SULMA Gordon is a 78 y.o. male here for right wrist pain and swelling, which started 10 days ago.  He had been out of several medications for couple of weeks, but restarted them 3 days ago.  He does think the wrist pain and swelling have improved in these past 3 days.  He brought his medications along today.  These include allopurinol and indomethacin, suggesting that he has had gout in the past.  However, today he states that he has never had swelling like this before, and his wrist or elsewhere.  Difficult to get an accurate history. He is here with his daughter today, but she does not recall whether he has had joint swelling in the past either.    He has noticed no joint swelling besides on the right wrist, and no fevers.  Past Medical History:   Diagnosis Date     Cognitive disorder      Gout      Hyperlipidemia      Current Outpatient Prescriptions on File Prior to Visit   Medication Sig Dispense Refill     allopurinol (ZYLOPRIM) 100 MG tablet TAKE 2 TABLETS (200 MG TOTAL) BY MOUTH DAILY. 180 tablet 2     amLODIPine (NORVASC) 5 MG tablet Take 1 tablet (5 mg total) by mouth daily. 30 tablet 11     indomethacin (INDOCIN) 50 MG capsule TAKE 1 CAPSULE (50 MG TOTAL) BY MOUTH 2 (TWO) TIMES A DAY FOR GOUT FLARES 30 capsule 10     lidocaine (XYLOCAINE) 5 % ointment Apply to painful joint three times a day as needed for pain 35.44 g 2     loratadine (CLARITIN) 10 mg tablet TAKE 1 PILL BY MOUTH DAILY AS NEEDED FOR ALLERGY 30 tablet 9     MAPAP EXTRA STRENGTH 500 mg tablet TAKE 1 PILL BY MOUTH EVERY 6 HOURS AS NEEDED FOR PAIN 90 tablet 5     multivitamin (ONE DAILY) per tablet Take 1 tablet by mouth daily. 100 tablet 3     VITAMIN D3 2,000 unit capsule TAKE 1 PILL (2,000 UNITS) BY MOUTH EVERY DAY 90 capsule 3     No current facility-administered medications on file prior to visit.      Social Hx:    Past medical history reviewed with no changes.   ?  ROS:   CV: No chest pain or palpitations.  Resp: No shortness of breath  "or cough.   MS: right wrist swelling, none in other joints    ?  O  /70  Pulse 68  Temp 98.3  F (36.8  C) (Oral)   Resp 22  Ht 5' 3.25\" (1.607 m)  Wt 157 lb 8 oz (71.4 kg)  SpO2 98% Comment: RA  BMI 27.68 kg/m2   Vitals reviewed. Nursing note reviewed.  General Appearance: Pleasant and alert, in no acute distress  HEENT: mucous membranes moist  CV: RRR, no murmur, rubs, gallops  Resp: No respiratory distress. Clear to auscultation bilaterally. No wheezes, rales, rhonchi  Ext: Right wrist is swollen, especially at the ulnar side, compared to left wrist.  Range of motion of right wrist is limited due to pain.  Lots of pain with flexion.  Skin: warm, dry, intact, no rash noted  Neuro: no focal deficits, CNs II-XII normal.   A/P  Chester was seen today for wrist pain.    Diagnoses and all orders for this visit:    Acute gout of right wrist, unspecified cause: His chart he does read that he has some kind of cognitive disorder, and I assume that he has had gout in the past since he was already on allopurinol and indomethacin.  I did offer to Chester Gordon and his daughter that we could try a short course of steroids to help with his pain, if he does not feel that his current regimen is enough, but he says he thinks things are getting better and he does not want to take another medicine right now.  Advised him to return if things are worsening.     Visit completed along with assistance of Leyla .  Options for treatment and follow-up care were reviewed with the patient and/or guardian. Chester Gordon and/or guardian engaged in the decision making process and verbalized understanding of the options discussed and agreed with the final plan.    Melinda Echeverria MD      "

## 2021-06-17 NOTE — PATIENT INSTRUCTIONS - HE
Patient Instructions by Patricia Brower RN at 9/16/2019  9:00 AM     Author: Patricia Brower RN Service: -- Author Type: Registered Nurse    Filed: 9/16/2019 12:12 PM Encounter Date: 9/16/2019 Status: Signed    : Patricia Brower RN (Registered Nurse)       Call Patient Education     When You Need a Blood Transfusion (Adult)  A blood transfusion may be done when you have lost blood because of an injury or during surgery. It can also be done because of diseases or conditions that affect the blood. Blood is made up of several different parts (blood products). You may receive some or all of these blood products during a transfusion. Blood for transfusion is usually donated from another person (donor). Strict measures are taken to make sure that donated blood is safe before it's given to you. This sheet helps you understand how a blood transfusion is done. Your healthcare provider will discuss your condition with you and answer your questions.    The parts of blood  Blood can be broken down into different parts that perform special roles in the body. These parts include:    Red blood cells, which carry oxygen throughout the body.    Platelets, which help stop bleeding.    Plasma (the liquid part of blood), which carries red blood cells and platelets throughout the body. Plasma also helps platelets in stopping bleeding.  Where does donated blood come from?    Volunteer donors. These are people who donate their blood to help others in need of blood. Blood donation can take place at several places, including a hospital, blood bank, or during a blood drive.    Directed donation. If you need a blood transfusion during a planned surgery, family and friends can have their blood tested for compatibility and donate blood for you before the surgery. This needs to be done at least 7 day(s) in advance. This is because the blood must be tested for safety.    Autologous donation. This is also called self-donation. For planned  surgery, you can donate your own blood starting up to 6 weeks before surgery.  Are blood transfusions safe?  Donated blood is tested and processed to make sure that the blood is safe:    The health and medical history of each donor is carefully screened. If a person is considered high-risk for infection or problems, he or she isn't accepted as a blood donor.    Donated blood is tested for infections such as hepatitis, syphilis, and HIV (the virus that causes AIDS). If the tested blood is found to be unsafe, it's destroyed.    Blood is divided into four types: A, B, AB, and O. Blood also has Rh types: positive (+) and negative (-). You can only receive blood products that are compatible with (match) your blood type. A sample of your blood is tested for compatibility with donated blood. This is done before blood products are prepared for a transfusion.  How is a blood transfusion done?  A blood transfusion takes place in a blood center, infusion center, hospital room, or operating room. Your healthcare provider will discuss the blood transfusion with you before it's done. You'll need to give permission for the blood transfusion by signing a consent form.    Two healthcare providers confirm your identity. They also confirm that they have the correct blood product(s) for you.    An intravenous (IV) line is placed in a vein if you do not already have an IV.    The blood product comes in a plastic bag that is hung on an IV pole. The blood product flows from the bag into your IV line. The IV line may be connected to a pump, which controls the transfusion rate. You may receive more than one kind of blood product through the IV.    Your vital signs (blood pressure, heart rate, respiratory rate, and temperature) are checked throughout the transfusion. This is to make sure you are not having a reaction to the blood product.    The IV line may be removed once the transfusion is complete.  Possible risks and complications of  blood transfusions  Most transfusions are problem free. In some cases, reactions occur. These can happen within seconds to minutes during the transfusion or a week to a few months after the transfusion. Call your doctor or nurse right away if you have any of the signs or symptoms in the table below during or after a transfusion:  Reaction Timing Symptoms   Allergic reaction (mild)   Within seconds to minutes during the transfusion    Up to 24 hours after the transfusion Hives or red welts on the skin, mild itching, rash, localized swelling, flushing (red face), wheezing, shortness of breath, or stridor (high-pitched noise or sound)   Anaphylactic reaction   Within seconds to minutes during the transfusion    Up to 24 hours after the transfusion Shortness of breath, flushing (red face), wheezing, labored (working hard) breathing, low blood pressure, localized swelling, chest tightness, or cramps   Febrile nonhemolytic reaction   Within minutes to hours during the transfusion    Within a few hours to 24 hours after the transfusion Fever (increase of 1  C or higher), chills, flushing (red face), nausea, headache, minor discomfort, or mild shortness of breath   Acute immune hemolytic reaction   Within minutes during the transfusion    Up to 24 hours after the transfusion Fever, red or brown urine, back pain, fast heart rate (tachycardia), abdominal pain, low blood pressure, feeling anxious, chills, chest pain, nausea, or fainting spells   Transfusion-related acute lung injury (TRALI)   Within 1 to 2 hours during the transfusion    Up to 6 hours after the transfusion Shortness of breath, trouble breathing, low blood pressure, fever, pulmonary edema   Transfusion-associated circulatory overload   Near the end of the transfusion    Within 6 hours after the transfusion Shortness of breath, fast heart rate (tachycardia), problems breathing when lying on back, abnormal blood pressure   Post-transfusion purpura (PUP)   Within  "1 week    Up to 48 days after the transfusion Purple spots on skin; nose bleed; bleeding from the urinary tract, abdomen, colon, or rectum; fever; or chills         \"Delayed\" transfusion-related acute lung injury (TRALI)   Within 72 hours (3 days) after the transfusion \"Sudden\" onset of respiratory distress or trouble breathing   \"Delayed\" hemolytic reaction   Within 3 to 7 days    Up to weeks after the transfusion Low-grade fever, mild jaundice (yellowing of the skin and whites of the eyes), decrease in hematocrit, chills, chest pain, back pain, nausea   Date Last Reviewed: 12/1/2016 2000-2017 [employer] [address] [place] [address]. All rights reserved. This information is not intended as a substitute for professional medical care. Always follow your healthcare professional's instructions.         Md with any concerns,  Patricia Brower       "

## 2021-06-17 NOTE — PATIENT INSTRUCTIONS - HE
Patient Instructions by Scott Rodriguez MD at 7/17/2019  1:45 PM     Author: Scott Rodriguez MD Service: -- Author Type: Physician    Filed: 7/17/2019  2:15 PM Encounter Date: 7/17/2019 Status: Signed    : Scott Rodriguez MD (Physician)       Patient Education   Patient Education   Patient Education   Patient Education   Patient Education   Patient Education   Patient Education     Pegfilgrastim Solution for injection  What is this medicine?  PEGFILGRASTIM (peg grace GRA stim) is a long-acting granulocyte colony-stimulating factor that stimulates the growth of neutrophils, a type of white blood cell important in the bodys fight against infection. It is used to reduce the incidence of fever and infection in patients with certain types of cancer who are receiving chemotherapy that affects the bone marrow.  This medicine may be used for other purposes; ask your health care provider or pharmacist if you have questions.  What should I tell my health care provider before I take this medicine?  They need to know if you have any of these conditions:    latex allergy    ongoing radiation therapy    sickle cell disease    skin reactions to acrylic adhesives (On-Body Injector only)    an unusual or allergic reaction to pegfilgrastim, filgrastim, other medicines, foods, dyes, or preservatives    pregnant or trying to get pregnant    breast-feeding  How should I use this medicine?  This medicine is for injection under the skin. If you get this medicine at home, you will be taught how to prepare and give the pre-filled syringe or how to use the On-body Injector. Refer to the patient Instructions for Use for detailed instructions. Use exactly as directed. Take your medicine at regular intervals. Do not take your medicine more often than directed.  It is important that you put your used needles and syringes in a special sharps container. Do not put them in a trash can. If you do not have a sharps container, call your  pharmacist or healthcare provider to get one.  Talk to your pediatrician regarding the use of this medicine in children. Special care may be needed.  Overdosage: If you think you have taken too much of this medicine contact a poison control center or emergency room at once.  NOTE: This medicine is only for you. Do not share this medicine with others.  What if I miss a dose?  It is important not to miss your dose. Call your doctor or health care professional if you miss your dose. If you miss a dose due to an On-body Injector failure or leakage, a new dose should be administered as soon as possible using a single prefilled syringe for manual use.  What may interact with this medicine?  Interactions have not been studied.  Give your health care provider a list of all the medicines, herbs, non-prescription drugs, or dietary supplements you use. Also tell them if you smoke, drink alcohol, or use illegal drugs. Some items may interact with your medicine.  What should I watch for while using this medicine?  You may need blood work done while you are taking this medicine.  If you are going to need a MRI, CT scan, or other procedure, tell your doctor that you are using this medicine (On-Body Injector only).  What side effects may I notice from receiving this medicine?  Side effects that you should report to your doctor or health care professional as soon as possible:    allergic reactions like skin rash, itching or hives, swelling of the face, lips, or tongue    dizziness    fever    pain, redness, or irritation at site where injected    pinpoint red spots on the skin    shortness of breath or breathing problems    stomach or side pain, or pain at the shoulder    swelling    tiredness    trouble passing urine  Side effects that usually do not require medical attention (report to your doctor or health care professional if they continue or are bothersome):    bone pain    muscle pain  This list may not describe all possible  side effects. Call your doctor for medical advice about side effects. You may report side effects to FDA at 8-658-FDA-0194.  Where should I keep my medicine?  Keep out of the reach of children.  Store pre-filled syringes in a refrigerator between 2 and 8 degrees C (36 and 46 degrees F). Do not freeze. Keep in carton to protect from light. Throw away this medicine if it is left out of the refrigerator for more than 48 hours. Throw away any unused medicine after the expiration date.  NOTE: This sheet is a summary. It may not cover all possible information. If you have questions about this medicine, talk to your doctor, pharmacist, or health care provider.  NOTE:This sheet is a summary. It may not cover all possible information. If you have questions about this medicine, talk to your doctor, pharmacist, or health care provider. Copyright  2015 Gold Standard           Vincristine Sulfate Solution for injection  What is this medicine?  VINCRISTINE (oma MEG teen) is a chemotherapy drug. It slows the growth of cancer cells. This medicine is used to treat many types of cancer like Hodgkin's disease, leukemia, non-Hodgkin's lymphoma, neuroblastoma (brain cancer), rhabdomyosarcoma, and Wilms' tumor.  This medicine may be used for other purposes; ask your health care provider or pharmacist if you have questions.  What should I tell my health care provider before I take this medicine?  They need to know if you have any of these conditions:    blood disorders    gout    infection (especially chickenpox, cold sores, or herpes)    kidney disease    liver disease    lung disease    nervous system disease like Charcot-Lila-Tooth (CMT)    recent or ongoing radiation therapy    an unusual or allergic reaction to vincristine, other chemotherapy agents, other medicines, foods, dyes, or preservatives    pregnant or trying to get pregnant    breast-feeding  How should I use this medicine?  This drug is given as an infusion into a vein.  It is administered in a hospital or clinic by a specially trained health care professional. If you have pain, swelling, burning, or any unusual feeling around the site of your injection, tell your health care professional right away.  Talk to your pediatrician regarding the use of this medicine in children. While this drug may be prescribed for selected conditions, precautions do apply.  Overdosage: If you think you have taken too much of this medicine contact a poison control center or emergency room at once.  NOTE: This medicine is only for you. Do not share this medicine with others.  What if I miss a dose?  It is important not to miss your dose. Call your doctor or health care professional if you are unable to keep an appointment.  What may interact with this medicine?  Do not take this medicine with any of the following medications:    itraconazole    mibefradil    voriconazole  This medicine may also interact with the following medications:    cyclosporine    erythromycin    fluconazole    ketoconazole    medicines for HIV like delavirdine, efavirenz, nevirapine    medicines for seizures like ethotoin, fosphenotoin, phenytoin    medicines to increase blood counts like filgrastim, pegfilgrastim, sargramostim    other chemotherapy drugs like cisplatin, L-asparaginase, methotrexate, mitomycin, paclitaxel    pegaspargase    vaccines    zalcitabine, ddC  Talk to your doctor or health care professional before taking any of these medicines:    acetaminophen    aspirin    ibuprofen    ketoprofen    naproxen  This list may not describe all possible interactions. Give your health care provider a list of all the medicines, herbs, non-prescription drugs, or dietary supplements you use. Also tell them if you smoke, drink alcohol, or use illegal drugs. Some items may interact with your medicine.  What should I watch for while using this medicine?  Your condition will be monitored carefully while you are receiving this  medicine. You will need important blood work done while you are taking this medicine.  This drug may make you feel generally unwell. This is not uncommon, as chemotherapy can affect healthy cells as well as cancer cells. Report any side effects. Continue your course of treatment even though you feel ill unless your doctor tells you to stop.  In some cases, you may be given additional medicines to help with side effects. Follow all directions for their use.  Call your doctor or health care professional for advice if you get a fever, chills or sore throat, or other symptoms of a cold or flu. Do not treat yourself.  Avoid taking products that contain aspirin, acetaminophen, ibuprofen, naproxen, or ketoprofen unless instructed by your doctor. These medicines may hide a fever.  Do not become pregnant while taking this medicine. Women should inform their doctor if they wish to become pregnant or think they might be pregnant. There is a potential for serious side effects to an unborn child. Talk to your health care professional or pharmacist for more information. Do not breast-feed an infant while taking this medicine.  Men may have a lower sperm count while taking this medicine. Talk to your doctor if you plan to father a child.  What side effects may I notice from receiving this medicine?  Side effects that you should report to your doctor or health care professional as soon as possible:    allergic reactions like skin rash, itching or hives, swelling of the face, lips, or tongue    breathing problems    confusion or changes in emotions or moods    constipation    cough    mouth sores    muscle weakness    nausea and vomiting    pain, swelling, redness or irritation at the injection site    pain, tingling, numbness in the hands or feet    problems with balance, talking, walking    seizures    stomach pain    trouble passing urine or change in the amount of urine  Side effects that usually do not require medical attention  (report to your doctor or health care professional if they continue or are bothersome):    diarrhea    hair loss    jaw pain    loss of appetite  This list may not describe all possible side effects. Call your doctor for medical advice about side effects. You may report side effects to FDA at 6-383-FDA-4333.  Where should I keep my medicine?  This drug is given in a hospital or clinic and will not be stored at home.  NOTE:This sheet is a summary. It may not cover all possible information. If you have questions about this medicine, talk to your doctor, pharmacist, or health care provider. Copyright  2015 Gold Standard           Vascular Access Port Implantation   Port implantation is surgery to place (implant) a port under the skin. For vascular access, it is placed into a vein. The port allows medicines or nutrition to be sent right into your bloodstream. Blood can also be taken or given through the port. During the procedure, a long, thin tube called a catheter is threaded into one of your large veins. The tube is then attached to the port. This usually sits under the skin of your chest and causes a small bump. To use the port, a special needle is passed through your skin and into the port. The needle can stay in your skin for up to 7 days, if needed. A port can stay in place for weeks or months or longer.    Why is a vascular access port needed?  A vascular access port may allow healthcare providers to give you:    Chemotherapy or other cancer-fighting drugs    IV treatments, such as antibiotics or nutrition    Hemodialysis (for kidney failure)  The port may also be used to draw blood.  Before the procedure  Follow any instructions you are given on how to prepare.  Tell your provider about any medicines you are taking. This includes:    All prescription medicines    Over-the-counter medicines such as aspirin or ibuprofen    Herbs, vitamins, and other supplements  Also be sure your provider knows:    If you are  pregnant or think you may be pregnant    If you are allergic to any medicines or substances, especially local anesthetics or iodine    Your full medical history, including why you will need the port    If you plan on doing any contact sports  During the procedure    Before the procedure, an IV may be put into a vein in your arm or hand. This gives you fluids and medicines. You may be given medicine through the IV to help you relax during the procedure. This is called sedation. But some surgeons place ports using general anesthesia.    The chest is used most often for the port. In some cases, your belly (abdomen) or arm will be used instead.    The skin over the insertion area is numbed with local anesthetic.    Ultrasound or X-rays are used to help the healthcare provider guide the catheter into the proper location during the procedure.    A cut (incision) is made in the skin where the port will be placed. A small pocket for the port is formed under the skin.    A second small incision is made in the skin near the first incision. A tunnel under the skin is created. The catheter is put through the tunnel and into the blood vessel.    The skin is closed over the port. It is held shut with stitches (sutures) or surgical glue or tape. The second small incision is also closed.    A chest X-ray may be done to make sure the port is placed properly.  After the procedure  You may be taken to a recovery room where youll recover from the sedation. Nurses will check on you as you rest. If you have pain, nurses can give you medicine. If you are not staying in the hospital overnight, you will be sent home a few hours after the procedure is done. A healthcare provider will tell you when you can go home. An adult family member or friend will need to drive you home.  Recovering at home    Take pain medicine as directed by your healthcare provider.    Take it easy for 24 hours after the procedure. Avoid physical activity and heavy  lifting until your healthcare provider says its OK.    Keep the port clean and dry. Ask when you can shower again. You will need to keep the port dry by covering it when you shower.    Care for the insertion site as you are directed.    Dont swim, bathe, or do other activities that cause water to cover the insertion site.    To keep the port from getting blocked with blood clots, flush it as often as directed. You should be shown the proper way to flush the port before you go home. It is important to follow these directions.     Risks and possible complications of implantation    Bleeding    Infection of the insertion site    Damage to a blood vessel    Nerve injury or irritation    Collapsed lung (for chest port placements)    Skin breakdown over the port  Risks and possible complications of having a port    Blocked  port or catheter    Leakage or breakage of the port or catheter    The port moves out of position    Blood clot    Skin or bloodstream infection    Skin breakdown over the port      When to seek medical care  Call your healthcare provider right away if you have any of the following:    A fever of 100.4 F (38.0 C) or higher    You can't access or use the port properly    You can't flush the port or get a blood return    The skin near the port is red, warm, swollen, or broken    You have shoulder pain on the side where the port is located    You feel a heart flutter or racing heart     Swollen arm, if the port is placed in your arm   Date Last Reviewed: 7/1/2016 2000-2017 [employer]. [address] [place], [place] [id number]. All rights reserved. This information is not intended as a substitute for professional medical care. Always follow your healthcare professional's instructions.           Rituximab Solution for injection  What is this medicine?  RITUXIMAB (ri TUX i mab) is a monoclonal antibody. This medicine changes the way the body's immune system works. It is used commonly to  treat non-Hodgkin's lymphoma and other conditions. In cancer cells, this drug targets a specific protein within cancer cells and stops the cancer cells from growing. It is also used to treat rhuematoid arthritis (RA). In RA, this medicine slow the inflammatory process and help reduce joint pain and swelling. This medicine is often used with other cancer or arthritis medications.  This medicine may be used for other purposes; ask your health care provider or pharmacist if you have questions.  What should I tell my health care provider before I take this medicine?  They need to know if you have any of these conditions:    blood disorders    heart disease    history of hepatitis B    infection (especially a virus infection such as chickenpox, cold sores, or herpes)    irregular heartbeat    kidney disease    lung or breathing disease, like asthma    lupus    an unusual or allergic reaction to rituximab, mouse proteins, other medicines, foods, dyes, or preservatives    pregnant or trying to get pregnant    breast-feeding  How should I use this medicine?  This medicine is for infusion into a vein. It is administered in a hospital or clinic by a specially trained health care professional.  A special MedGuide will be given to you by the pharmacist with each prescription and refill. Be sure to read this information carefully each time.  Talk to your pediatrician regarding the use of this medicine in children. This medicine is not approved for use in children.  Overdosage: If you think you have taken too much of this medicine contact a poison control center or emergency room at once.  NOTE: This medicine is only for you. Do not share this medicine with others.  What if I miss a dose?  It is important not to miss a dose. Call your doctor or health care professional if you are unable to keep an appointment.  What may interact with this medicine?    cisplatin    medicines for blood pressure    some other medicines for  arthritis    vaccines  This list may not describe all possible interactions. Give your health care provider a list of all the medicines, herbs, non-prescription drugs, or dietary supplements you use. Also tell them if you smoke, drink alcohol, or use illegal drugs. Some items may interact with your medicine.  What should I watch for while using this medicine?  Report any side effects that you notice during your treatment right away, such as changes in your breathing, fever, chills, dizziness or lightheadedness. These effects are more common with the first dose.  Visit your prescriber or health care professional for checks on your progress. You will need to have regular blood work. Report any other side effects. The side effects of this medicine can continue after you finish your treatment. Continue your course of treatment even though you feel ill unless your doctor tells you to stop.  Call your doctor or health care professional for advice if you get a fever, chills or sore throat, or other symptoms of a cold or flu. Do not treat yourself. This drug decreases your body's ability to fight infections. Try to avoid being around people who are sick.  This medicine may increase your risk to bruise or bleed. Call your doctor or health care professional if you notice any unusual bleeding.  Be careful brushing and flossing your teeth or using a toothpick because you may get an infection or bleed more easily. If you have any dental work done, tell your dentist you are receiving this medicine.  Avoid taking products that contain aspirin, acetaminophen, ibuprofen, naproxen, or ketoprofen unless instructed by your doctor. These medicines may hide a fever.  Do not become pregnant while taking this medicine. Women should inform their doctor if they wish to become pregnant or think they might be pregnant. There is a potential for serious side effects to an unborn child. Talk to your health care professional or pharmacist for more  information. Do not breast-feed an infant while taking this medicine.  What side effects may I notice from receiving this medicine?  Side effects that you should report to your doctor or health care professional as soon as possible:    allergic reactions like skin rash, itching or hives, swelling of the face, lips, or tongue    low blood counts - this medicine may decrease the number of white blood cells, red blood cells and platelets. You may be at increased risk for infections and bleeding.    signs of infection - fever or chills, cough, sore throat, pain or difficulty passing urine    signs of decreased platelets or bleeding - bruising, pinpoint red spots on the skin, black, tarry stools, blood in the urine    signs of decreased red blood cells - unusually weak or tired, fainting spells, lightheadedness    breathing problems    confused, not responsive    chest pain    fast, irregular heartbeat    feeling faint or lightheaded, falls    mouth sores    redness, blistering, peeling or loosening of the skin, including inside the mouth    stomach pain    swelling of the ankles, feet, or hands    trouble passing urine or change in the amount of urine  Side effects that usually do not require medical attention (report to your doctor or other health care professional if they continue or are bothersome):    anxiety    headache    loss of appetite    muscle aches    nausea    night sweats  This list may not describe all possible side effects. Call your doctor for medical advice about side effects. You may report side effects to FDA at 6-908-FDA-4173.  Where should I keep my medicine?  This drug is given in a hospital or clinic and will not be stored at home.  NOTE:This sheet is a summary. It may not cover all possible information. If you have questions about this medicine, talk to your doctor, pharmacist, or health care provider. Copyright  2015 Gold Standard           Etoposide Phosphate Solution for injection  What is  this medicine?  ETOPOSIDE, -16 (e toe LYSSA side) is a chemotherapy drug. It is used to treat testicular cancer, lung cancer, and other cancers.  This medicine may be used for other purposes; ask your health care provider or pharmacist if you have questions.  What should I tell my health care provider before I take this medicine?  They need to know if you have any of these conditions:    infection    kidney disease    low blood counts, like low white cell, platelet, or red cell counts    an unusual or allergic reaction to etoposide, other chemotherapeutic agents, other medicines, foods, dyes, or preservatives    pregnant or trying to get pregnant    breast-feeding  How should I use this medicine?  This medicine is for infusion into a vein. It is administered in a hospital or clinic by a specially trained health care professional.  Talk to your pediatrician regarding the use of this medicine in children. Special care may be needed.  Overdosage: If you think you have taken too much of this medicine contact a poison control center or emergency room at once.  NOTE: This medicine is only for you. Do not share this medicine with others.  What if I miss a dose?  It is important not to miss your dose. Call your doctor or health care professional if you are unable to keep an appointment.  What may interact with this medicine?    cyclosporine    medicines to increase blood counts like filgrastim, pegfilgrastim, sargramostim    vaccines  This list may not describe all possible interactions. Give your health care provider a list of all the medicines, herbs, non-prescription drugs, or dietary supplements you use. Also tell them if you smoke, drink alcohol, or use illegal drugs. Some items may interact with your medicine.  What should I watch for while using this medicine?  Visit your doctor for checks on your progress. This drug may make you feel generally unwell. This is not uncommon, as chemotherapy can affect healthy cells as  well as cancer cells. Report any side effects. Continue your course of treatment even though you feel ill unless your doctor tells you to stop.  In some cases, you may be given additional medicines to help with side effects. Follow all directions for their use.  Call your doctor or health care professional for advice if you get a fever, chills or sore throat, or other symptoms of a cold or flu. Do not treat yourself. This drug decreases your body's ability to fight infections. Try to avoid being around people who are sick.  This medicine may increase your risk to bruise or bleed. Call your doctor or health care professional if you notice any unusual bleeding.  Be careful brushing and flossing your teeth or using a toothpick because you may get an infection or bleed more easily. If you have any dental work done, tell your dentist you are receiving this medicine.  Avoid taking products that contain aspirin, acetaminophen, ibuprofen, naproxen, or ketoprofen unless instructed by your doctor. These medicines may hide a fever.  Do not become pregnant while taking this medicine. Women should inform their doctor if they wish to become pregnant or think they might be pregnant. There is a potential for serious side effects to an unborn child. Talk to your health care professional or pharmacist for more information. Do not breast-feed an infant while taking this medicine.  What side effects may I notice from receiving this medicine?  Side effects that you should report to your doctor or health care professional as soon as possible:    allergic reactions like skin rash, itching or hives, swelling of the face, lips, or tongue    low blood counts - this medicine may decrease the number of white blood cells, red blood cells and platelets. You may be at increased risk for infections and bleeding.    signs of infection - fever or chills, cough, sore throat, pain or difficulty passing urine    signs of decreased platelets or bleeding  - bruising, pinpoint red spots on the skin, black, tarry stools, blood in the urine    signs of decreased red blood cells - unusually weak or tired, fainting spells, lightheadedness    breathing problems    changes in vision    mouth or throat sores or ulcers    pain, redness, swelling or irritation at the injection site    pain, tingling, numbness in the hands or feet    redness, blistering, peeling or loosening of the skin, including inside the mouth    seizures    vomiting  Side effects that usually do not require medical attention (report to your doctor or health care professional if they continue or are bothersome):    diarrhea    hair loss    loss of appetite    nausea    stomach pain  This list may not describe all possible side effects. Call your doctor for medical advice about side effects. You may report side effects to FDA at 8-166-FDA-7847.  Where should I keep my medicine?  This drug is given in a hospital or clinic and will not be stored at home.  NOTE:This sheet is a summary. It may not cover all possible information. If you have questions about this medicine, talk to your doctor, pharmacist, or health care provider. Copyright  2015 [employer] Standard           Doxorubicin Hydrochloride Solution for injection  What is this medicine?  DOXORUBICIN (dox oh JANET bi sin) is a chemotherapy drug. It is used to treat many kinds of cancer like Hodgkin's disease, leukemia, non-Hodgkin's lymphoma, neuroblastoma, sarcoma, and Wilms' tumor. It is also used to treat bladder cancer, breast cancer, lung cancer, ovarian cancer, stomach cancer, and thyroid cancer.  This medicine may be used for other purposes; ask your health care provider or pharmacist if you have questions.  What should I tell my health care provider before I take this medicine?  They need to know if you have any of these conditions:    blood disorders    heart disease, recent heart attack    infection (especially a virus infection such as chickenpox, cold  sores, or herpes)    irregular heartbeat    liver disease    recent or ongoing radiation therapy    an unusual or allergic reaction to doxorubicin, other chemotherapy agents, other medicines, foods, dyes, or preservatives    pregnant or trying to get pregnant    breast-feeding  How should I use this medicine?  This drug is given as an infusion into a vein. It is administered in a hospital or clinic by a specially trained health care professional. If you have pain, swelling, burning or any unusual feeling around the site of your injection, tell your health care professional right away.  Talk to your pediatrician regarding the use of this medicine in children. Special care may be needed.  Overdosage: If you think you have taken too much of this medicine contact a poison control center or emergency room at once.  NOTE: This medicine is only for you. Do not share this medicine with others.  What if I miss a dose?  It is important not to miss your dose. Call your doctor or health care professional if you are unable to keep an appointment.  What may interact with this medicine?  Do not take this medicine with any of the following medications:    cisapride    droperidol    halofantrine    pimozide    zidovudine  This medicine may also interact with the following medications:    chloroquine    chlorpromazine    clarithromycin    cyclophosphamide    cyclosporine    erythromycin    medicines for depression, anxiety, or psychotic disturbances    medicines for irregular heart beat like amiodarone, bepridil, dofetilide, encainide, flecainide, propafenone, quinidine    medicines for seizures like ethotoin, fosphenytoin, phenytoin    medicines for nausea, vomiting like dolasetron, ondansetron, palonosetron    medicines to increase blood counts like filgrastim, pegfilgrastim, sargramostim    methadone    methotrexate    pentamidine    progesterone    vaccines    verapamil  Talk to your doctor or health care professional before  taking any of these medicines:    acetaminophen    aspirin    ibuprofen    ketoprofen    naproxen  This list may not describe all possible interactions. Give your health care provider a list of all the medicines, herbs, non-prescription drugs, or dietary supplements you use. Also tell them if you smoke, drink alcohol, or use illegal drugs. Some items may interact with your medicine.  What should I watch for while using this medicine?  Your condition will be monitored carefully while you are receiving this medicine. You will need important blood work done while you are taking this medicine.  This drug may make you feel generally unwell. This is not uncommon, as chemotherapy can affect healthy cells as well as cancer cells. Report any side effects. Continue your course of treatment even though you feel ill unless your doctor tells you to stop.  Your urine may turn red for a few days after your dose. This is not blood. If your urine is dark or brown, call your doctor.  In some cases, you may be given additional medicines to help with side effects. Follow all directions for their use.  Call your doctor or health care professional for advice if you get a fever, chills or sore throat, or other symptoms of a cold or flu. Do not treat yourself. This drug decreases your body's ability to fight infections. Try to avoid being around people who are sick.  This medicine may increase your risk to bruise or bleed. Call your doctor or health care professional if you notice any unusual bleeding.  Be careful brushing and flossing your teeth or using a toothpick because you may get an infection or bleed more easily. If you have any dental work done, tell your dentist you are receiving this medicine.  Avoid taking products that contain aspirin, acetaminophen, ibuprofen, naproxen, or ketoprofen unless instructed by your doctor. These medicines may hide a fever.  Men and women of childbearing age should use effective birth control  methods while using taking this medicine. Do not become pregnant while taking this medicine. There is a potential for serious side effects to an unborn child. Talk to your health care professional or pharmacist for more information. Do not breast-feed an infant while taking this medicine.  Do not let others touch your urine or other body fluids for 5 days after each treatment with this medicine. Caregivers should wear latex gloves to avoid touching body fluids during this time.  There is a maximum amount of this medicine you should receive throughout your life. The amount depends on the medical condition being treated and your overall health. Your doctor will watch how much of this medicine you receive in your lifetime. Tell your doctor if you have taken this medicine before.  What side effects may I notice from receiving this medicine?  Side effects that you should report to your doctor or health care professional as soon as possible:    allergic reactions like skin rash, itching or hives, swelling of the face, lips, or tongue    low blood counts - this medicine may decrease the number of white blood cells, red blood cells and platelets. You may be at increased risk for infections and bleeding.    signs of infection - fever or chills, cough, sore throat, pain or difficulty passing urine    signs of decreased platelets or bleeding - bruising, pinpoint red spots on the skin, black, tarry stools, blood in the urine    signs of decreased red blood cells - unusually weak or tired, fainting spells, lightheadedness    breathing problems    chest pain    fast, irregular heartbeat    mouth sores    nausea, vomiting    pain, swelling, redness at site where injected    pain, tingling, numbness in the hands or feet    swelling of ankles, feet, or hands    unusual bleeding or bruising  Side effects that usually do not require medical attention (report to your doctor or health care professional if they continue or are  bothersome):    diarrhea    facial flushing    hair loss    loss of appetite    missed menstrual periods    nail discoloration or damage    red or watery eyes    red colored urine    stomach upset  This list may not describe all possible side effects. Call your doctor for medical advice about side effects. You may report side effects to FDA at 3-591-JPT-0215.  Where should I keep my medicine?  This drug is given in a hospital or clinic and will not be stored at home.  NOTE:This sheet is a summary. It may not cover all possible information. If you have questions about this medicine, talk to your doctor, pharmacist, or health care provider. Copyright  2015 Gold Standard           Cyclophosphamide Solution for injection  What is this medicine?  CYCLOPHOSPHAMIDE (sye kloe XAVIER fa mide) is a chemotherapy drug. It slows the growth of cancer cells. This medicine is used to treat many types of cancer like lymphoma, myeloma, leukemia, breast cancer, and ovarian cancer, to name a few.  This medicine may be used for other purposes; ask your health care provider or pharmacist if you have questions.  What should I tell my health care provider before I take this medicine?  They need to know if you have any of these conditions:    blood disorders    history of other chemotherapy    infection    kidney disease    liver disease    recent or ongoing radiation therapy    tumors in the bone marrow    an unusual or allergic reaction to cyclophosphamide, other chemotherapy, other medicines, foods, dyes, or preservatives    pregnant or trying to get pregnant    breast-feeding  How should I use this medicine?  This drug is usually given as an injection into a vein or muscle or by infusion into a vein. It is administered in a hospital or clinic by a specially trained health care professional.  Talk to your pediatrician regarding the use of this medicine in children. Special care may be needed.  Overdosage: If you think you have taken too  much of this medicine contact a poison control center or emergency room at once.  NOTE: This medicine is only for you. Do not share this medicine with others.  What if I miss a dose?  It is important not to miss your dose. Call your doctor or health care professional if you are unable to keep an appointment.  What may interact with this medicine?  This medicine may interact with the following medications:    amiodarone    amphotericin B    azathioprine    certain antiviral medicines for HIV or AIDS such as protease inhibitors (e.g., indinavir, ritonavir) and zidovudine    certain blood pressure medications such as benazepril, captopril, enalapril, fosinopril, lisinopril, moexipril, monopril, perindopril, quinapril, ramipril, trandolapril    certain cancer medications such as anthracyclines (e.g., daunorubicin, doxorubicin), busulfan, cytarabine, paclitaxel, pentostatin, tamoxifen, trastuzumab    certain diuretics such as chlorothiazide, chlorthalidone, hydrochlorothiazide, indapamide, metolazone    certain medicines that treat or prevent blood clots like warfarin    certain muscle relaxants such as succinylcholine    cyclosporine    etanercept    indomethacin    medicines to increase blood counts like filgrastim, pegfilgrastim, sargramostim    medicines used as general anesthesia    metronidazole    natalizumab  This list may not describe all possible interactions. Give your health care provider a list of all the medicines, herbs, non-prescription drugs, or dietary supplements you use. Also tell them if you smoke, drink alcohol, or use illegal drugs. Some items may interact with your medicine.  What should I watch for while using this medicine?  Visit your doctor for checks on your progress. This drug may make you feel generally unwell. This is not uncommon, as chemotherapy can affect healthy cells as well as cancer cells. Report any side effects. Continue your course of treatment even though you feel ill unless  your doctor tells you to stop.  Drink water or other fluids as directed. Urinate often, even at night.  In some cases, you may be given additional medicines to help with side effects. Follow all directions for their use.  Call your doctor or health care professional for advice if you get a fever, chills or sore throat, or other symptoms of a cold or flu. Do not treat yourself. This drug decreases your body's ability to fight infections. Try to avoid being around people who are sick.  This medicine may increase your risk to bruise or bleed. Call your doctor or health care professional if you notice any unusual bleeding.  Be careful brushing and flossing your teeth or using a toothpick because you may get an infection or bleed more easily. If you have any dental work done, tell your dentist you are receiving this medicine.  You may get drowsy or dizzy. Do not drive, use machinery, or do anything that needs mental alertness until you know how this medicine affects you.  Do not become pregnant while taking this medicine or for 1 year after stopping it. Women should inform their doctor if they wish to become pregnant or think they might be pregnant. Men should not father a child while taking thismedicine and for 4 months after stopping it. There is a potential for serious side effects to an unborn child. Talk to your health care professional or pharmacist for more information. Do not breast-feed an infant while taking this medicine.  This medicine may interfere with the ability to have a child. This medicine has caused ovarian failure in some women. This medicine has caused reduced sperm counts in some men. You should talk with your doctor or health care professional if you are concerned about your fertility.  If you are going to have surgery, tell your doctor or health care professional that you have taken this medicine.  What side effects may I notice from receiving this medicine?  Side effects that you should report  to your doctor or health care professional as soon as possible:    allergic reactions like skin rash, itching or hives, swelling of the face, lips, or tongue    low blood counts - this medicine may decrease the number of white blood cells, red blood cells and platelets. You may be at increased risk for infections and bleeding.    signs of infection - fever or chills, cough, sore throat, pain or difficulty passing urine    signs of decreased platelets or bleeding - bruising, pinpoint red spots on the skin, black, tarry stools, blood in the urine    signs of decreased red blood cells - unusually weak or tired, fainting spells, lightheadedness    breathing problems    dark urine    dizziness    palpitations    swelling of the ankles, feet, hands    trouble passing urine or change in the amount of urine    weight gain    yellowing of the eyes or skin  Side effects that usually do not require medical attention (report to your doctor or health care professional if they continue or are bothersome):    changes in nail or skin color    hair loss    missed menstrual periods    mouth sores    nausea, vomiting  This list may not describe all possible side effects. Call your doctor for medical advice about side effects. You may report side effects to FDA at 9-001-FDA-2165.  Where should I keep my medicine?  This drug is given in a hospital or clinic and will not be stored at home.  NOTE:This sheet is a summary. It may not cover all possible information. If you have questions about this medicine, talk to your doctor, pharmacist, or health care provider. Copyright  2015 Gold Standard

## 2021-06-17 NOTE — PROGRESS NOTES
"HPI - 79 yo male here to f/u on gout and HTN.       Gout - pain is better after taking meds  Continue allopurinol daily and indomethacin prn   He also has lidocaine ointment.   Uric acid 8.8 -> 6.5  On 6/29/17 -> 5.7 on 1/4/18     HTN - BP is good today  Will change to a combo pill of losartan-HCTZ 50-12.5   stopped lisinopril b/c of cough which has now improved      Vit D 46.0 on 6/2017 0n daily Vit D 2000 -> 50.4 on 1/4/18      Polypharmacy -   Home RN sets up meds      Current Outpatient Prescriptions   Medication Sig     allopurinol (ZYLOPRIM) 100 MG tablet TAKE 2 TABLETS (200 MG TOTAL) BY MOUTH DAILY.     indomethacin (INDOCIN) 50 MG capsule TAKE 1 CAPSULE (50 MG TOTAL) BY MOUTH 2 (TWO) TIMES A DAY FOR GOUT FLARES     lidocaine (XYLOCAINE) 5 % ointment Apply to painful joint three times a day as needed for pain     loratadine (CLARITIN) 10 mg tablet TAKE 1 PILL BY MOUTH DAILY AS NEEDED FOR ALLERGY     losartan-hydrochlorothiazide (HYZAAR) 50-12.5 mg per tablet Take 1 tablet by mouth daily.     MAPAP EXTRA STRENGTH 500 mg tablet TAKE 1 PILL BY MOUTH EVERY 6 HOURS AS NEEDED FOR PAIN     multivitamin (ONE DAILY) per tablet Take 1 tablet by mouth daily.     VITAMIN D3 2,000 unit capsule TAKE 1 PILL (2,000 UNITS) BY MOUTH EVERY DAY     Vitals:    05/03/18 1430   BP: 120/64   Pulse: 77   Resp: 16   Temp: 97.9  F (36.6  C)   TempSrc: Oral   SpO2: 93%   Weight: 160 lb 1.6 oz (72.6 kg)   Height: 5' 3.35\" (1.609 m)     PHYSICAL EXAM   General Appearance: Awake and alert, in no acute distress  HEENT: neck is supple  CV: regular rate  Resp: No respiratory distress. Breathing comfortably  Musculoskeletal: moving limbs comfortably with not deficits or deformities  Skin: no rashes noted    A/P  Gout - pain is better after taking meds  Continue allopurinol daily and indomethacin prn   He also has lidocaine ointment.   Uric acid 8.8 -> 6.5  On 6/29/17 -> 5.7 on 1/4/18     HTN -  Well controlled with  losartan-HCTZ 50-12.5 "        Vit D 46.0 on 6/2017 0n daily Vit D 2000 -> 50.4 on 1/4/18      Polypharmacy -   Home RN sets up meds

## 2021-06-17 NOTE — PROGRESS NOTES
"HPI - 77 yo male here for HTN f/u and gout.    Gout - pain is better after taking meds  On allopurinol daily and indomethacin prn about daily  He also has lidocaine ointment.   Uric acid 8.8 -> 6.5  On 6/29/17 -> 5.7 on 1/4/18  He was seen by Dr. Echeverria  on 2/15/18 for a gout flare with right wrist pain after having been out of the meds for weeks.      HTN - BP is good today  He has lisinopril 5mg and amlodipine 5mg     Cough  -   stopped lisinopril last visit but he is still taking     Vit D 46.0 on 6/2017 0n daily Vit D 2000 -> 50.4 on 1/4/18     Polypharmacy -   Home RN sets up meds    Current Outpatient Prescriptions   Medication Sig Note     allopurinol (ZYLOPRIM) 100 MG tablet TAKE 2 TABLETS (200 MG TOTAL) BY MOUTH DAILY.      amLODIPine (NORVASC) 5 MG tablet Take 1 tablet (5 mg total) by mouth daily.      indomethacin (INDOCIN) 50 MG capsule TAKE 1 CAPSULE (50 MG TOTAL) BY MOUTH 2 (TWO) TIMES A DAY FOR GOUT FLARES      lidocaine (XYLOCAINE) 5 % ointment Apply to painful joint three times a day as needed for pain      lisinopril (PRINIVIL,ZESTRIL) 5 MG tablet Take 5 mg by mouth daily. 2/15/2018: Received from: External Pharmacy     loratadine (CLARITIN) 10 mg tablet TAKE 1 PILL BY MOUTH DAILY AS NEEDED FOR ALLERGY      MAPAP EXTRA STRENGTH 500 mg tablet TAKE 1 PILL BY MOUTH EVERY 6 HOURS AS NEEDED FOR PAIN      multivitamin (ONE DAILY) per tablet Take 1 tablet by mouth daily.      VITAMIN D3 2,000 unit capsule TAKE 1 PILL (2,000 UNITS) BY MOUTH EVERY DAY      Vitals:    04/03/18 1348   BP: 134/70   Patient Site: Right Arm   Patient Position: Sitting   Cuff Size: Adult Regular   Pulse: 82   Temp: 98.1  F (36.7  C)   SpO2: 97%   Weight: 157 lb 6.4 oz (71.4 kg)   Height: 5' 3.5\" (1.613 m)     PHYSICAL EXAM   General Appearance: Awake and alert, in no acute distress  HEENT: neck is supple  CV: regular rate  Resp: No respiratory distress. Breathing comfortably  Musculoskeletal: moving limbs comfortably with not " deficits or deformities  Skin: no rashes noted      A/P  Gout - pain is better after taking meds  Continue allopurinol daily and indomethacin prn about daily  He also has lidocaine ointment.   Uric acid 8.8 -> 6.5  On 6/29/17 -> 5.7 on 1/4/18     HTN - BP is good today  Will change to a combo pill of losartan-HCTZ 50-12.5   stopped lisinopril b/c of cough     Vit D 46.0 on 6/2017 0n daily Vit D 2000 -> 50.4 on 1/4/18     Polypharmacy -   Home RN sets up meds  Printed new med list to give to RN to set up     Spent 25 min face to face with patient with more the 50% spent in counseling, reviewing chart, and coordination of care and discussing problems listed above.

## 2021-06-19 NOTE — LETTER
Letter by Rubi Zuniga RN at      Author: Rubi Zuniga RN Service: -- Author Type: --    Filed:  Encounter Date: 5/28/2019 Status: (Other)       May 29, 2019    KYI THEIN  [address]  [place] [address]    Dear Chester:     Your Care Coordinator has been unable to reach you by telephone. I am writing to ask you or a family member to call me at [phone]. If you reach my voicemail, please leave a message with your daytime telephone number and a date and time that I can call you. If you are hearing impaired, please call the [place] [employer] at [phone] or [phone] ([id number] relay service).     The reason I am trying to reach you is:     [x] Six (6) month check-in  [] To schedule your annual assessment  [] Other:      Please call me as soon as you receive this letter. I look forward to speaking with you.    Sincerely,      Rubi Zuniga RN    E-mail: [email or web address]  Phone: [phone]    Care Manager  [facility]+ [id number]    [id number] (11/18)

## 2021-06-19 NOTE — PROGRESS NOTES
"HPI - 77 yo male hre for BP check and gout f/u.       Gout wrist and ankles- pain is better after taking meds  Continue allopurinol daily and indomethacin and tylenol prn   He also has lidocaine ointment.   Uric acid 8.8 -> 6.5  On 6/29/17 -> 5.7 on 1/4/18      HTN -  Well controlled with  losartan-HCTZ 50-12.5          Vit D 46.0 on 6/2017 0n daily Vit D 2000 -> 50.4 on 1/4/18      Polypharmacy -   Home RN sets up meds  Not bottle for allopurinol      Current Outpatient Prescriptions   Medication Sig     acetaminophen (MAPAP EXTRA STRENGTH) 500 MG tablet Take 1 tablet (500 mg total) by mouth every 6 (six) hours as needed for pain.     indomethacin (INDOCIN) 50 MG capsule TAKE 1 CAPSULE (50 MG TOTAL) BY MOUTH 2 (TWO) TIMES A DAY FOR GOUT FLARES     loratadine (CLARITIN) 10 mg tablet TAKE 1 PILL BY MOUTH DAILY AS NEEDED FOR ALLERGY     losartan-hydrochlorothiazide (HYZAAR) 50-12.5 mg per tablet Take 1 tablet by mouth daily.     multivitamin (ONE DAILY) per tablet Take 1 tablet by mouth daily.     VITAMIN D3 2,000 unit capsule TAKE 1 PILL (2,000 UNITS) BY MOUTH EVERY DAY     allopurinol (ZYLOPRIM) 100 MG tablet TAKE 2 TABLETS (200 MG TOTAL) BY MOUTH DAILY.     lidocaine (XYLOCAINE) 5 % ointment Apply to painful joint three times a day as needed for pain     Vitals:    08/09/18 1419   BP: 110/68   Patient Site: Left Arm   Patient Position: Sitting   Cuff Size: Adult Regular   Pulse: 90   Resp: 20   Temp: 98.7  F (37.1  C)   TempSrc: Oral   SpO2: 94%   Weight: 152 lb 3.2 oz (69 kg)   Height: 5' 3.35\" (1.609 m)     A/P  Gout wrist and ankles- pain is better after taking meds  Continue allopurinol daily and indomethacin prn   He also has lidocaine ointment.   Uric acid 8.8 -> 6.5  On 6/29/17 -> 5.7 on 1/4/18      HTN -  Well controlled with  losartan-HCTZ 50-12.5          Vit D 46.0 on 6/2017 0n daily Vit D 2000 -> 50.4 on 1/4/18      Polypharmacy -   Home RN sets up meds    "

## 2021-06-19 NOTE — LETTER
Letter by Millie Park RN at      Author: Millie Park RN Service: -- Author Type: --    Filed:  Encounter Date: 6/24/2019 Status: (Other)       Dear Chester Gordon,    Thank you for choosing [facility] for your care.  We are committed to providing you with the highest quality and compassionate healthcare services.  The following information pertains to your first appointment with our clinic.    Date/Time of appointment:  Wednesday, 7/17/19, 12:15 PM       Name of your Physician: Dr. Rodriguez    What to bring to your appointment:      Completed Patient History/Initial Nursing Assessment and Medication/Allergy List (these forms were sent to you).    Any paperwork or films from your physician that we have asked you to bring.    Your current insurance card(s).    Parking:      Please refer to the map included to direct you.  The [facility] is located at the [address] end of [facility] in [place], [place].      After turning onto [address] from [facility], take a right turn at the first stop sign.  We have designated parking on the left, identified as parking for Cancer Care patients (Lot D).     The Code to Enter Lot D is: 0701. This code changes monthly and will always coincide with the current month followed by 01. For example August will be 0801.  The month will continue to change but the 01 will remain constant.  If lot D is full please use Parking Lot A, directly across the street.    Please enter the [facility] Center on the north end of the [facility].  You will see a sign on the building.        For Medical Oncology or Hematology appointments, please take the elevator to the second floor to check in.     Also please note appointments can last 2-2.5 hours.      We hope these instructions are helpful to you.  If you have any questions or concerns, please call us at [phone].  It is our pleasure to assist you.    Warm Regards,  Tatyana Park, MIR  Nurse  Navigator  [phone]

## 2021-06-24 NOTE — TELEPHONE ENCOUNTER
RN cannot approve Refill Request    RN can NOT refill this medication med is not covered by policy/route to provider     . Last office visit: 8/31/2017 Sofya Smith MD Last Physical: Visit date not found Last MTM visit: Visit date not found Last visit same specialty: 2/12/2019 Mikki Jiménez MD.  Next visit within 3 mo: Visit date not found  Next physical within 3 mo: Visit date not found      Della Solomon, [facility] Triage/Med Refill 2/26/2019    Requested Prescriptions   Pending Prescriptions Disp Refills     indomethacin (INDOCIN) 50 MG capsule [Pharmacy Med Name: INDOMETHACIN 50 MG CAPSULE 50 CAP] 30 capsule 10     Sig: TAKE 1 CAPSULE (50 MG TOTAL) BY MOUTH 2 (TWO) TIMES A DAY FOR GOUT FLARES    There is no refill protocol information for this order

## 2021-06-24 NOTE — PROGRESS NOTES
HPI - 78 yo male here for a few issues.      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   Uric acid 8.8 -> 6.5  On 6/29/17 -> 5.7 on 1/4/18 - > 10.1 on 8/9/18       HTN -  Well controlled with  losartan-HCTZ 50-12.5   Last BMP 1/2018    H/o high lipids -      Vit D 46.0 on 6/2017 0n daily Vit D 2000 -> 50.4 on 1/4/18 -> 47.8 on 8/9/18    H/o anemia - last hbg 2015      Polypharmacy -   Home RN sets up meds  Bottles reconciled with med list -   Old bottles of allopurinol for Nov/Dec 2018 full   Bottles for indomethacin and tylenol from May 2018 - but these are PRN meds  Stopped claritin - no longer having allergy sx    Current Outpatient Medications   Medication Sig     acetaminophen (MAPAP EXTRA STRENGTH) 500 MG tablet Take 1 tablet (500 mg total) by mouth every 6 (six) hours as needed for pain.     allopurinol (ZYLOPRIM) 100 MG tablet Take 2 tablets (200 mg total) by mouth daily.     indomethacin (INDOCIN) 50 MG capsule TAKE 1 CAPSULE (50 MG TOTAL) BY MOUTH 2 (TWO) TIMES A DAY FOR GOUT FLARES     lidocaine (XYLOCAINE) 5 % ointment Apply to painful joint three times a day as needed for pain     loratadine (CLARITIN) 10 mg tablet TAKE 1 PILL BY MOUTH DAILY AS NEEDED FOR ALLERGY     losartan-hydrochlorothiazide (HYZAAR) 50-12.5 mg per tablet Take 1 tablet by mouth daily.     polyvinyl alcohol (LIQUIFILM TEARS) 1.4 % ophthalmic solution Administer 2 drops to both eyes as needed.     TAB-A-MARK per tablet TAKE 1 TABLET BY MOUTH DAILY.     VITAMIN D3 2,000 unit capsule TAKE 1 PILL (2,000 UNITS) BY MOUTH EVERY DAY     VITAMIN D3 2,000 unit capsule TAKE 1 PILL (2,000 UNITS) BY MOUTH EVERY DAY     Vitals:    02/12/19 1050   BP: 122/66   Pulse: 96   Temp: 98.1  F (36.7  C)   TempSrc: Oral   SpO2: 95%   Weight: 160 lb (72.6 kg)  "  Height: 5' 3.35\" (1.609 m)     PHYSICAL EXAM   General Appearance: Awake and alert, in no acute distress  HEENT: neck is supple  CV: regular rate  Resp: No respiratory distress. Breathing comfortably  Musculoskeletal: moving limbs comfortably with not deficits or deformities  Skin: no rashes noted    Result Date: 2/12/2019  XR CHEST 2 VIEWS 2/12/2019 12:05 PM INDICATION: History of latent tuberculosis infection. s/p tx but chronic cough COMPARISON: CT 4/1/2013 FINDINGS: Right diaphragmatic eventration. Stable heart size. Stable right paratracheal soft tissue density compatible with the CT identified infiltrate. This appears stable. No new infiltrate seen.    A/P  Adult  form - see scanned   Advised low salt diet  History of  Latent Tuberculosis 795.51  Tspot positive with normal CXR at refugee screen in [facility] 7/8/10, TB clinic in [facility] report he completed tx  Checked CXR today  - no evidence of TB    Gout wrist and ankles- Continue allopurinol daily and indomethacin prn and lidocaine ointment prn  Uric acid - check today      HTN -  Well controlled with  losartan-HCTZ 50-12.5   Check CMP    H/o high lipids - check      Vit D continue 0n daily Vit D 2000 and MVI   Check Vit D level    H/o anemia - check CBC      Polypharmacy -   Home RN sets up meds  Bottles reconciled with med list -   Old bottles of allopurinol for Nov/Dec 2018 full  - discarded - he has new bottle from 1/30/19  Bottles for indomethacin and tylenol from May 2018 - but these are PRN meds  Stopped claritin - no longer having allergy sx    Flu shot given today        Spent 25 min face to face with patient with more the 50% spent in counseling, reviewing chart, and coordination of care and discussing problems listed above.         "